# Patient Record
Sex: MALE | Race: WHITE | NOT HISPANIC OR LATINO | URBAN - METROPOLITAN AREA
[De-identification: names, ages, dates, MRNs, and addresses within clinical notes are randomized per-mention and may not be internally consistent; named-entity substitution may affect disease eponyms.]

---

## 2022-06-03 ENCOUNTER — OUTPATIENT (OUTPATIENT)
Dept: OUTPATIENT SERVICES | Facility: HOSPITAL | Age: 57
LOS: 1 days | End: 2022-06-03
Payer: COMMERCIAL

## 2022-06-03 DIAGNOSIS — I25.10 ATHEROSCLEROTIC HEART DISEASE OF NATIVE CORONARY ARTERY WITHOUT ANGINA PECTORIS: ICD-10-CM

## 2022-06-03 PROCEDURE — A9500: CPT

## 2022-06-03 PROCEDURE — 78452 HT MUSCLE IMAGE SPECT MULT: CPT

## 2022-06-03 PROCEDURE — 93016 CV STRESS TEST SUPVJ ONLY: CPT

## 2022-06-03 PROCEDURE — 93017 CV STRESS TEST TRACING ONLY: CPT

## 2022-06-03 PROCEDURE — 78452 HT MUSCLE IMAGE SPECT MULT: CPT | Mod: 26

## 2022-06-03 PROCEDURE — 93018 CV STRESS TEST I&R ONLY: CPT

## 2023-01-27 PROBLEM — Z00.00 ENCOUNTER FOR PREVENTIVE HEALTH EXAMINATION: Status: ACTIVE | Noted: 2023-01-27

## 2023-01-30 ENCOUNTER — APPOINTMENT (OUTPATIENT)
Dept: MRI IMAGING | Facility: CLINIC | Age: 58
End: 2023-01-30
Payer: COMMERCIAL

## 2023-01-30 ENCOUNTER — OUTPATIENT (OUTPATIENT)
Dept: OUTPATIENT SERVICES | Facility: HOSPITAL | Age: 58
LOS: 1 days | End: 2023-01-30

## 2023-01-30 PROCEDURE — 71555 MRI ANGIO CHEST W OR W/O DYE: CPT | Mod: 26

## 2023-01-31 PROBLEM — Z78.9 SOCIAL ALCOHOL USE: Status: ACTIVE | Noted: 2023-01-31

## 2023-01-31 PROBLEM — Z87.891 FORMER SMOKER: Status: ACTIVE | Noted: 2023-01-31

## 2023-02-01 ENCOUNTER — NON-APPOINTMENT (OUTPATIENT)
Age: 58
End: 2023-02-01

## 2023-02-01 ENCOUNTER — APPOINTMENT (OUTPATIENT)
Dept: CARDIOTHORACIC SURGERY | Facility: CLINIC | Age: 58
End: 2023-02-01
Payer: COMMERCIAL

## 2023-02-01 VITALS
OXYGEN SATURATION: 97 % | SYSTOLIC BLOOD PRESSURE: 114 MMHG | BODY MASS INDEX: 23.8 KG/M2 | HEART RATE: 50 BPM | RESPIRATION RATE: 17 BRPM | HEIGHT: 71 IN | TEMPERATURE: 97.8 F | DIASTOLIC BLOOD PRESSURE: 72 MMHG | WEIGHT: 170 LBS

## 2023-02-01 DIAGNOSIS — I10 ESSENTIAL (PRIMARY) HYPERTENSION: ICD-10-CM

## 2023-02-01 DIAGNOSIS — Z82.49 FAMILY HISTORY OF ISCHEMIC HEART DISEASE AND OTHER DISEASES OF THE CIRCULATORY SYSTEM: ICD-10-CM

## 2023-02-01 DIAGNOSIS — Z87.891 PERSONAL HISTORY OF NICOTINE DEPENDENCE: ICD-10-CM

## 2023-02-01 DIAGNOSIS — Z78.9 OTHER SPECIFIED HEALTH STATUS: ICD-10-CM

## 2023-02-01 DIAGNOSIS — K44.9 DIAPHRAGMATIC HERNIA W/OUT OBSTRUCTION OR GANGRENE: ICD-10-CM

## 2023-02-01 DIAGNOSIS — I25.10 ATHEROSCLEROTIC HEART DISEASE OF NATIVE CORONARY ARTERY W/OUT ANGINA PECTORIS: ICD-10-CM

## 2023-02-01 PROCEDURE — 99204 OFFICE O/P NEW MOD 45 MIN: CPT

## 2023-02-02 PROBLEM — Z82.49 FAMILY HISTORY OF ATRIAL FIBRILLATION: Status: ACTIVE | Noted: 2023-02-02

## 2023-02-02 PROBLEM — K44.9 HIATAL HERNIA: Status: ACTIVE | Noted: 2023-02-02

## 2023-02-02 PROBLEM — I25.10 NONOBSTRUCTIVE ATHEROSCLEROSIS OF CORONARY ARTERY: Status: ACTIVE | Noted: 2023-02-02

## 2023-02-02 PROBLEM — Z82.49 FAMILY HISTORY OF THORACIC AORTIC ANEURYSM: Status: ACTIVE | Noted: 2023-02-02

## 2023-02-02 PROBLEM — I10 HYPERTENSION, UNSPECIFIED TYPE: Status: ACTIVE | Noted: 2023-02-02

## 2023-02-06 NOTE — PROCEDURE
[FreeTextEntry1] : Patient was advised to view the educational video prior to this visit regarding aortic pathology, risk factors, surgical procedures, and lifestyle modifications. Video can be retrieved at [email or web address].\par

## 2023-02-06 NOTE — CONSULT LETTER
[Dear  ___] : Dear  [unfilled], [FreeTextEntry2] : Sanjay Joiner MD  [FreeTextEntry1] : I had the pleasure of seeing your patient, YULIA ZARAGOZA,in my office today. \par \par We take a multidisciplinary team approach to patient care and consider you, the primary physician, an extension of our team. We will maintain an open line of communication with you throughout your patient's treatment course. \par \par He  is being evaluated for thoracic aortic aneurysm. I have reviewed all of the patient's medical records and diagnostic images at the time of his  office consultation. I have enclosed a copy for your records. \par \par I have reviewed the indications for surgery,and used our webpage [email or web address] <[email or web address]> to illustrate the aorta and anatomy of the heart. The patient meets criteria for surgery. I have recommended that the patient is a candidate for a valve sparing aortic root replacement, possible aortic valve replacement.  I will update you on his perioperative status and disposition upon discharge. \par \par I appreciate the opportunity to care for your patient at the [facility] based at [facility]. If there are any questions or concerns, please call me directly at [phone]. \par \par Please see my note below. \par \par Sincerely, \par \par \par \par Yosef Cerna M.D.\par Professor of Cardiovascular and Thoracic Surgery\par Minimally Invasive Valve Surgeon\par Director of Aortic Surgery, [facility]\par Cell: [phone]\par Email: [email or web address] \par \par [facility]:\par [address], [address], [place] [address]\par Office: [phone]\par Fax: [phone]\par \par [facility]:\par Department of Cardiovascular and Thoracic Surgery\par [address] [place], [place], [address]\par Office: [phone]\par Fax: [phone]\par \par Practice Manager: Ms. Nelly Sethi\par Email: [email or web address]\par Phone: [phone]

## 2023-02-06 NOTE — PHYSICAL EXAM
[General Appearance - Alert] : alert [General Appearance - In No Acute Distress] : in no acute distress [Sclera] : the sclera and conjunctiva were normal [Outer Ear] : the ears and nose were normal in appearance [Neck Appearance] : the appearance of the neck was normal [] : no respiratory distress [Respiration, Rhythm And Depth] : normal respiratory rhythm and effort [Heart Rate And Rhythm] : heart rate was normal and rhythm regular [Heart Sounds] : normal S1 and S2 [Examination Of The Chest] : the chest was normal in appearance [2+] : left 2+ [No Abnormalities] : the abdominal aorta was not enlarged and no bruit was heard [Bowel Sounds] : normal bowel sounds [Abdomen Soft] : soft [No CVA Tenderness] : no ~M costovertebral angle tenderness [Abnormal Walk] : normal gait [Skin Color & Pigmentation] : normal skin color and pigmentation [No Focal Deficits] : no focal deficits [Oriented To Time, Place, And Person] : oriented to person, place, and time [FreeTextEntry1] : deferred

## 2023-02-06 NOTE — HISTORY OF PRESENT ILLNESS
[FreeTextEntry1] : 57 year old male with a past medical hx of nonobstructive CAD and HTN who presents for initial evaluation and management of aortic root and ascending aortic aneurysm. Family hx significant for aortic aneurysm (2 brothers under surveillance). \par Cardiologist Dr. Sanjay Joiner. \par PCP Dr. Dottie Oh. \par \par Patient has known of TAA since 4.7cm. Recent imaging has demonstrated aortic root has expanded to 5.2cm. \par \par TTE 1/25/2023: normal EF. aortic root 5.2cm. ascending aorta 4.7cm. \par trileaflet aortic valve with mild to moderate aortic regurgitation. mild aortic valve leaflet calcifications.\par \par MRA chest 1/30/23:\par 1. Thoracic aortic aneurysm measuring up to 5.1 cm in the aortic root.\par 2. Moderate-sized hiatal hernia with intrathoracic stomach.\par \par Patient is doing well and denies recent hospitalization, ER visits, or surgeries. He  denies fever, chills, fatigue, headache, blurred vision, dizziness, syncope, chest pain, palpitations, shortness of breath, orthopnea, paroxysmal nocturnal dyspnea, nausea, vomiting, abdominal pain, back pain, BRBPR or swelling to legs.\par \par Patient runs 4x/week and is physically active. \par

## 2023-02-06 NOTE — END OF VISIT
[Time Spent: ___ minutes] : I have spent [unfilled] minutes of time on the encounter. [FreeTextEntry3] : \par I, MELISSA CAMPBELLU , am scribing for and in the presence of ANGELA VEGA the following sections: History of present illness, past Medical/family/surgical/family/social history, review of systems, vital signs, physical exam and disposition.\par \par I personally performed the services described in the documentation, reviewed the documentation recorded by the scribe in my presence and it accurately and completely records my words and actions.\par

## 2023-02-06 NOTE — ASSESSMENT
[FreeTextEntry1] : 57 year old male with a past medical hx of nonobstructive CAD and HTN who presents for initial evaluation and management of aortic root and ascending aortic aneurysm. Family hx significant for TAA (2brothers). \par \par I have reviewed the patient's medical records, diagnostic images during the time of this office consultation and have made the following recommendation. I have reviewed the indications for surgery, and used our webpage [email or web address] <[email or web address]> to illustrate the aorta and anatomy of the heart. I have discussed the indications for surgery with the patient. Those indications are the following: size greater than 5.0 cm, symptomatic aneurysms, family history of aortic dissection or aneurysm death with a size greater than 4.5 cm, other necessary cardiac procedures such as coronary artery bypass grafting or valvular disorders with an aneurysm greater than 4.5 cm, or connective tissue disorders with an aneurysm size greater than 4.5 cm. The patient meets the indications.\par \par I had a lengthy discussion with the patient regarding his aneurysmal disease and progression. I have recommended that the patient is a candidate for a valve sparing aortic root replacement, possible aortic valve replacement. In the event that the aortic valve requires a replacement intraop under direct visualization, the patient was educated on various valve options - mechanical vs. bioprosthesis and has chosen a bioprosthesis. \par \par I have discussed the risks, benefits and alternatives to surgery. I have explained the risks of the surgery, including approximately 1% major mortality or morbidity including stroke, infection, bleeding, death, renal failure and heart attack. All questions were addressed and patient agrees to proceed with surgery. I have answered all questions fully.\par \par -the patient is a candidate for a valve sparing aortic root replacement, possible AVR. date of surgery pending: end of February. pt will contact me end of next week to finalize surgery date. \par -admit the day before for cardiac cath and preop testing. covid test 3 days prior to sx. will schedule on admission (carotid doppler, chest xray, labs). \par -continue current medication regimen. \par -continue cardiology care with Dr. Sanjay Joiner. \par -thoracic f/u for hiatal hernia after surgery. \par \par

## 2023-02-14 ENCOUNTER — NON-APPOINTMENT (OUTPATIENT)
Age: 58
End: 2023-02-14

## 2023-03-02 ENCOUNTER — NON-APPOINTMENT (OUTPATIENT)
Age: 58
End: 2023-03-02

## 2023-03-13 ENCOUNTER — LABORATORY RESULT (OUTPATIENT)
Age: 58
End: 2023-03-13

## 2023-03-14 PROBLEM — I10 ESSENTIAL (PRIMARY) HYPERTENSION: Chronic | Status: ACTIVE | Noted: 2023-03-09

## 2023-03-14 PROBLEM — I42.8 OTHER CARDIOMYOPATHIES: Chronic | Status: ACTIVE | Noted: 2023-03-09

## 2023-03-14 PROBLEM — Z86.79 PERSONAL HISTORY OF OTHER DISEASES OF THE CIRCULATORY SYSTEM: Chronic | Status: ACTIVE | Noted: 2023-03-09

## 2023-03-15 ENCOUNTER — TRANSCRIPTION ENCOUNTER (OUTPATIENT)
Age: 58
End: 2023-03-15

## 2023-03-15 ENCOUNTER — INPATIENT (INPATIENT)
Facility: HOSPITAL | Age: 58
LOS: 5 days | Discharge: HOME HEALTH | DRG: 216 | End: 2023-03-21
Attending: THORACIC SURGERY (CARDIOTHORACIC VASCULAR SURGERY) | Admitting: THORACIC SURGERY (CARDIOTHORACIC VASCULAR SURGERY)
Payer: COMMERCIAL

## 2023-03-15 VITALS
OXYGEN SATURATION: 100 % | RESPIRATION RATE: 18 BRPM | HEART RATE: 52 BPM | DIASTOLIC BLOOD PRESSURE: 58 MMHG | SYSTOLIC BLOOD PRESSURE: 131 MMHG | HEIGHT: 71 IN | TEMPERATURE: 98 F | WEIGHT: 169.98 LBS

## 2023-03-15 DIAGNOSIS — Z98.890 OTHER SPECIFIED POSTPROCEDURAL STATES: Chronic | ICD-10-CM

## 2023-03-15 LAB
A1C WITH ESTIMATED AVERAGE GLUCOSE RESULT: 5.4 % — SIGNIFICANT CHANGE UP (ref 4–5.6)
ALBUMIN SERPL ELPH-MCNC: 4.2 G/DL — SIGNIFICANT CHANGE UP (ref 3.3–5)
ALP SERPL-CCNC: 63 U/L — SIGNIFICANT CHANGE UP (ref 40–120)
ALT FLD-CCNC: 24 U/L — SIGNIFICANT CHANGE UP (ref 10–45)
ANION GAP SERPL CALC-SCNC: 12 MMOL/L — SIGNIFICANT CHANGE UP (ref 5–17)
APPEARANCE UR: CLEAR — SIGNIFICANT CHANGE UP
APTT BLD: 26.3 SEC — LOW (ref 27.5–35.5)
AST SERPL-CCNC: 21 U/L — SIGNIFICANT CHANGE UP (ref 10–40)
BASE EXCESS BLDA CALC-SCNC: -0.2 MMOL/L — SIGNIFICANT CHANGE UP (ref -2–3)
BASOPHILS # BLD AUTO: 0.03 K/UL — SIGNIFICANT CHANGE UP (ref 0–0.2)
BASOPHILS NFR BLD AUTO: 0.5 % — SIGNIFICANT CHANGE UP (ref 0–2)
BILIRUB SERPL-MCNC: 0.7 MG/DL — SIGNIFICANT CHANGE UP (ref 0.2–1.2)
BILIRUB UR-MCNC: NEGATIVE — SIGNIFICANT CHANGE UP
BLD GP AB SCN SERPL QL: NEGATIVE — SIGNIFICANT CHANGE UP
BLD GP AB SCN SERPL QL: NEGATIVE — SIGNIFICANT CHANGE UP
BUN SERPL-MCNC: 28 MG/DL — HIGH (ref 7–23)
CA-I BLDA-SCNC: 1.19 MMOL/L — SIGNIFICANT CHANGE UP (ref 1.15–1.33)
CALCIUM SERPL-MCNC: 8.8 MG/DL — SIGNIFICANT CHANGE UP (ref 8.4–10.5)
CHLORIDE SERPL-SCNC: 109 MMOL/L — HIGH (ref 96–108)
CHOLEST SERPL-MCNC: 136 MG/DL — SIGNIFICANT CHANGE UP
CK MB CFR SERPL CALC: 2.9 NG/ML — SIGNIFICANT CHANGE UP (ref 0–6.7)
CK SERPL-CCNC: 138 U/L — SIGNIFICANT CHANGE UP (ref 30–200)
CO2 BLDA-SCNC: 25 MMOL/L — HIGH (ref 19–24)
CO2 SERPL-SCNC: 21 MMOL/L — LOW (ref 22–31)
COHGB MFR BLDA: 1.2 % — SIGNIFICANT CHANGE UP
COLOR SPEC: YELLOW — SIGNIFICANT CHANGE UP
CREAT SERPL-MCNC: 0.97 MG/DL — SIGNIFICANT CHANGE UP (ref 0.5–1.3)
DIFF PNL FLD: NEGATIVE — SIGNIFICANT CHANGE UP
EGFR: 92 ML/MIN/1.73M2 — SIGNIFICANT CHANGE UP
EOSINOPHIL # BLD AUTO: 0.1 K/UL — SIGNIFICANT CHANGE UP (ref 0–0.5)
EOSINOPHIL NFR BLD AUTO: 1.8 % — SIGNIFICANT CHANGE UP (ref 0–6)
ESTIMATED AVERAGE GLUCOSE: 108 MG/DL — SIGNIFICANT CHANGE UP (ref 68–114)
GLUCOSE BLDA-MCNC: 95 MG/DL — SIGNIFICANT CHANGE UP (ref 70–99)
GLUCOSE SERPL-MCNC: 92 MG/DL — SIGNIFICANT CHANGE UP (ref 70–99)
GLUCOSE UR QL: NEGATIVE — SIGNIFICANT CHANGE UP
HCO3 BLDA-SCNC: 24 MMOL/L — SIGNIFICANT CHANGE UP (ref 21–28)
HCT VFR BLD CALC: 38.7 % — LOW (ref 39–50)
HDLC SERPL-MCNC: 48 MG/DL — SIGNIFICANT CHANGE UP
HGB BLD-MCNC: 13 G/DL — SIGNIFICANT CHANGE UP (ref 13–17)
HGB BLDA-MCNC: 12.5 G/DL — LOW (ref 12.6–17.4)
IMM GRANULOCYTES NFR BLD AUTO: 0.2 % — SIGNIFICANT CHANGE UP (ref 0–0.9)
INR BLD: 1.02 — SIGNIFICANT CHANGE UP (ref 0.88–1.16)
KETONES UR-MCNC: NEGATIVE — SIGNIFICANT CHANGE UP
LEUKOCYTE ESTERASE UR-ACNC: NEGATIVE — SIGNIFICANT CHANGE UP
LIPID PNL WITH DIRECT LDL SERPL: 74 MG/DL — SIGNIFICANT CHANGE UP
LYMPHOCYTES # BLD AUTO: 1.33 K/UL — SIGNIFICANT CHANGE UP (ref 1–3.3)
LYMPHOCYTES # BLD AUTO: 24.1 % — SIGNIFICANT CHANGE UP (ref 13–44)
MCHC RBC-ENTMCNC: 31.1 PG — SIGNIFICANT CHANGE UP (ref 27–34)
MCHC RBC-ENTMCNC: 33.6 GM/DL — SIGNIFICANT CHANGE UP (ref 32–36)
MCV RBC AUTO: 92.6 FL — SIGNIFICANT CHANGE UP (ref 80–100)
METHGB MFR BLDA: 0.7 % — SIGNIFICANT CHANGE UP
MONOCYTES # BLD AUTO: 0.58 K/UL — SIGNIFICANT CHANGE UP (ref 0–0.9)
MONOCYTES NFR BLD AUTO: 10.5 % — SIGNIFICANT CHANGE UP (ref 2–14)
NEUTROPHILS # BLD AUTO: 3.46 K/UL — SIGNIFICANT CHANGE UP (ref 1.8–7.4)
NEUTROPHILS NFR BLD AUTO: 62.9 % — SIGNIFICANT CHANGE UP (ref 43–77)
NITRITE UR-MCNC: NEGATIVE — SIGNIFICANT CHANGE UP
NON HDL CHOLESTEROL: 88 MG/DL — SIGNIFICANT CHANGE UP
NRBC # BLD: 0 /100 WBCS — SIGNIFICANT CHANGE UP (ref 0–0)
OXYHGB MFR BLDA: 97 % — HIGH (ref 90–95)
PCO2 BLDA: 37 MMHG — SIGNIFICANT CHANGE UP (ref 35–48)
PH BLDA: 7.42 — SIGNIFICANT CHANGE UP (ref 7.35–7.45)
PH UR: 6 — SIGNIFICANT CHANGE UP (ref 5–8)
PLATELET # BLD AUTO: 187 K/UL — SIGNIFICANT CHANGE UP (ref 150–400)
PO2 BLDA: 129 MMHG — HIGH (ref 83–108)
POCT ISTAT CREATININE: 0.9 MG/DL — SIGNIFICANT CHANGE UP (ref 0.5–1.3)
POCT ISTAT CREATININE: 1 MG/DL — SIGNIFICANT CHANGE UP (ref 0.5–1.3)
POTASSIUM BLDA-SCNC: 4 MMOL/L — SIGNIFICANT CHANGE UP (ref 3.5–5.1)
POTASSIUM SERPL-MCNC: 3.9 MMOL/L — SIGNIFICANT CHANGE UP (ref 3.5–5.3)
POTASSIUM SERPL-SCNC: 3.9 MMOL/L — SIGNIFICANT CHANGE UP (ref 3.5–5.3)
PROT SERPL-MCNC: 6.4 G/DL — SIGNIFICANT CHANGE UP (ref 6–8.3)
PROT UR-MCNC: NEGATIVE MG/DL — SIGNIFICANT CHANGE UP
PROTHROM AB SERPL-ACNC: 12.2 SEC — SIGNIFICANT CHANGE UP (ref 10.5–13.4)
RBC # BLD: 4.18 M/UL — LOW (ref 4.2–5.8)
RBC # FLD: 13.2 % — SIGNIFICANT CHANGE UP (ref 10.3–14.5)
RH IG SCN BLD-IMP: POSITIVE — SIGNIFICANT CHANGE UP
RH IG SCN BLD-IMP: POSITIVE — SIGNIFICANT CHANGE UP
SAO2 % BLDA: 98.9 % — HIGH (ref 94–98)
SODIUM BLDA-SCNC: 140 MMOL/L — SIGNIFICANT CHANGE UP (ref 136–145)
SODIUM SERPL-SCNC: 142 MMOL/L — SIGNIFICANT CHANGE UP (ref 135–145)
SP GR SPEC: 1.02 — SIGNIFICANT CHANGE UP (ref 1–1.03)
TRIGL SERPL-MCNC: 71 MG/DL — SIGNIFICANT CHANGE UP
TROPONIN T SERPL-MCNC: 0.01 NG/ML — SIGNIFICANT CHANGE UP (ref 0–0.01)
TSH SERPL-MCNC: 1.4 UIU/ML — SIGNIFICANT CHANGE UP (ref 0.27–4.2)
UROBILINOGEN FLD QL: 0.2 E.U./DL — SIGNIFICANT CHANGE UP
WBC # BLD: 5.51 K/UL — SIGNIFICANT CHANGE UP (ref 3.8–10.5)
WBC # FLD AUTO: 5.51 K/UL — SIGNIFICANT CHANGE UP (ref 3.8–10.5)

## 2023-03-15 PROCEDURE — 94010 BREATHING CAPACITY TEST: CPT | Mod: 26

## 2023-03-15 PROCEDURE — 93880 EXTRACRANIAL BILAT STUDY: CPT | Mod: 26

## 2023-03-15 PROCEDURE — 93010 ELECTROCARDIOGRAM REPORT: CPT

## 2023-03-15 PROCEDURE — 99152 MOD SED SAME PHYS/QHP 5/>YRS: CPT

## 2023-03-15 PROCEDURE — 71046 X-RAY EXAM CHEST 2 VIEWS: CPT | Mod: 26

## 2023-03-15 PROCEDURE — 93454 CORONARY ARTERY ANGIO S&I: CPT | Mod: 26

## 2023-03-15 RX ORDER — SODIUM CHLORIDE 9 MG/ML
3 INJECTION INTRAMUSCULAR; INTRAVENOUS; SUBCUTANEOUS ONCE
Refills: 0 | Status: DISCONTINUED | OUTPATIENT
Start: 2023-03-15 | End: 2023-03-16

## 2023-03-15 RX ORDER — HEPARIN SODIUM 5000 [USP'U]/ML
5000 INJECTION INTRAVENOUS; SUBCUTANEOUS ONCE
Refills: 0 | Status: DISCONTINUED | OUTPATIENT
Start: 2023-03-15 | End: 2023-03-16

## 2023-03-15 RX ORDER — CHLORHEXIDINE GLUCONATE 213 G/1000ML
1 SOLUTION TOPICAL ONCE
Refills: 0 | Status: DISCONTINUED | OUTPATIENT
Start: 2023-03-15 | End: 2023-03-16

## 2023-03-15 RX ORDER — ASPIRIN/CALCIUM CARB/MAGNESIUM 324 MG
81 TABLET ORAL DAILY
Refills: 0 | Status: DISCONTINUED | OUTPATIENT
Start: 2023-03-15 | End: 2023-03-16

## 2023-03-15 RX ORDER — CHLORHEXIDINE GLUCONATE 213 G/1000ML
15 SOLUTION TOPICAL ONCE
Refills: 0 | Status: COMPLETED | OUTPATIENT
Start: 2023-03-15 | End: 2023-03-16

## 2023-03-15 RX ORDER — CHLORHEXIDINE GLUCONATE 213 G/1000ML
1 SOLUTION TOPICAL ONCE
Refills: 0 | Status: COMPLETED | OUTPATIENT
Start: 2023-03-15 | End: 2023-03-15

## 2023-03-15 RX ORDER — CHLORHEXIDINE GLUCONATE 213 G/1000ML
1 SOLUTION TOPICAL ONCE
Refills: 0 | Status: COMPLETED | OUTPATIENT
Start: 2023-03-16 | End: 2023-03-16

## 2023-03-15 RX ORDER — ATORVASTATIN CALCIUM 80 MG/1
80 TABLET, FILM COATED ORAL AT BEDTIME
Refills: 0 | Status: DISCONTINUED | OUTPATIENT
Start: 2023-03-15 | End: 2023-03-16

## 2023-03-15 RX ORDER — POTASSIUM CHLORIDE 20 MEQ
20 PACKET (EA) ORAL ONCE
Refills: 0 | Status: COMPLETED | OUTPATIENT
Start: 2023-03-15 | End: 2023-03-15

## 2023-03-15 RX ORDER — SODIUM CHLORIDE 9 MG/ML
250 INJECTION INTRAMUSCULAR; INTRAVENOUS; SUBCUTANEOUS ONCE
Refills: 0 | Status: DISCONTINUED | OUTPATIENT
Start: 2023-03-15 | End: 2023-03-15

## 2023-03-15 RX ORDER — PANTOPRAZOLE SODIUM 20 MG/1
40 TABLET, DELAYED RELEASE ORAL
Refills: 0 | Status: DISCONTINUED | OUTPATIENT
Start: 2023-03-15 | End: 2023-03-16

## 2023-03-15 RX ADMIN — Medication 20 MILLIEQUIVALENT(S): at 13:28

## 2023-03-15 RX ADMIN — ATORVASTATIN CALCIUM 80 MILLIGRAM(S): 80 TABLET, FILM COATED ORAL at 23:21

## 2023-03-15 RX ADMIN — CHLORHEXIDINE GLUCONATE 1 APPLICATION(S): 213 SOLUTION TOPICAL at 23:30

## 2023-03-15 NOTE — H&P ADULT - HISTORY OF PRESENT ILLNESS
Cardiologist: Dr. Sanjay Joiner  Pharmacy:   Escort:  COVID: 03/13/2023 (-) in HIE     58yo M former smoker with +FHx aortic aneurysm (2 brothers) PMHx nonobstructive CAD, HTN, hiatal hernia presented to Dr. Cerna for evaluation of aortic root and ascending aortic aneurysm. Known TAA since 4.7cm that has recently expanded to 5.2cm. Denies ____ chest pain, SOB, LUO, palpitations, dizziness, LOC, N/V/D, fever/chills/sick contact, diaphoresis, orthopnea/PND, and leg swelling. TTE 1/25/23: normal EF, aortic root 5.2cm, ascending aorta 4.7cm, trileaflet aortic valve with mild-mod AR. MRA 1/30/23: thoracic aortic aneurysm measuring up to 5.1cm in aortic root, mod sized hiatal hernia with intrathoracic stomach. Renal US 1/25/23: mild diffuse plaque in abdominal aorta, no renal artery stenosis. Patient presents for pre-op [facility] prior to valve sparing aortic root replacement with possible AV replacement. Patient to be admitted to [facility] post procedure for procedure with Dr. Cerna.       [facility] - pre-op testing ordered (carotid doppler, CXR)**   Cardiologist: Dr. Sanjay Joiner  Pharmacy: [facility]   Escort: To Be Admitted to [facility] post procedure   COVID: 03/13/2023 (-) in HIE     58yo M,  with +FHx aortic aneurysm (2 brothers) PMHx nonobstructive CAD, HTN, hiatal hernia presented to Dr. Cerna for evaluation of aortic root and ascending aortic aneurysm. Known TAA since 4.7cm that has recently expanded to 5.2cm. Denies chest pain, SOB, LUO, palpitations, dizziness, LOC, N/V/D, fever/chills/sick contact, diaphoresis, orthopnea/PND, and leg swelling. TTE 1/25/23: normal EF, aortic root 5.2cm, ascending aorta 4.7cm, trileaflet aortic valve with mild-mod AR. MRA 1/30/23: thoracic aortic aneurysm measuring up to 5.1cm in aortic root, mod sized hiatal hernia with intrathoracic stomach. Renal US 1/25/23: mild diffuse plaque in abdominal aorta, no renal artery stenosis. Patient presents for pre-op [facility] prior to valve sparing aortic root replacement with possible AV replacement. Patient to be admitted to [facility] post procedure for procedure with Dr. Cerna.       **Admit to [facility] after cardiac cath - pre-op testing ordered (carotid doppler, CXR)**

## 2023-03-15 NOTE — H&P ADULT - NSICDXFAMILYHX_GEN_ALL_CORE_FT
FAMILY HISTORY:  Sibling  Still living? Unknown  FHx: thoracic aortic aneurysm, Age at diagnosis: Age Unknown

## 2023-03-15 NOTE — PRE-ANESTHESIA EVALUATION ADULT - NSANTHPMHFT_GEN_ALL_CORE
58yo M,  with +FHx aortic aneurysm (2 brothers) PMHx nonobstructive CAD, HTN, hiatal hernia presented to Dr. Cerna for evaluation of aortic root and ascending aortic aneurysm. Known TAA since 4.7cm that has recently expanded to 5.2cm. Denies chest pain, SOB, LUO, palpitations, dizziness, LOC, N/V/D, fever/chills/sick contact, diaphoresis, orthopnea/PND, and leg swelling. TTE 1/25/23: normal EF, aortic root 5.2cm, ascending aorta 4.7cm, trileaflet aortic valve with mild-mod AR. MRA 1/30/23: thoracic aortic aneurysm measuring up to 5.1cm in aortic root, mod sized hiatal hernia with intrathoracic stomach. Renal US 1/25/23: mild diffuse plaque in abdominal aorta, no renal artery stenosis. Patient presents for pre-op C prior to valve sparing aortic root replacement with possible AV replacement. Patient to be admitted to [facility] post procedure for procedure with Dr. Cerna.       	No Known Allergies:     Home Medications:   •?	omeprazole 20 mg oral delayed release tablet: Last Dose Taken: 15-Mar-2023 AM, 1 tab(s) orally once a day  •?	Crestor 20 mg oral tablet: Last Dose Taken: 14-Mar-2023 PM, 1 tab(s) orally once a day  •?	losartan 25 mg oral tablet: Last Dose Taken: 15-Mar-2023 AM, 1 tab(s) orally once a day    PAST MEDICAL HISTORY:  H/O aortic aneurysm   HTN (hypertension)   Nonobstructive cardiomyopathy.     PAST SURGICAL HISTORY:  H/O knee surgery   History of hip surgery.

## 2023-03-15 NOTE — PROGRESS NOTE ADULT - SUBJECTIVE AND OBJECTIVE BOX
Planned Date of Surgery:       3/16/23                                                                                                           Surgeon: Nehemiah    Procedure: Valve sparing aortic root placement, EF NL    HPI:  58yo M,  with +FHx aortic aneurysm (2 brothers) PMHx nonobstructive CAD, HTN, hiatal hernia presented to Dr. Cerna for evaluation of aortic root and ascending aortic aneurysm. Known TAA since 4.7cm that has recently expanded to 5.2cm. Denies chest pain, SOB, LUO, palpitations, dizziness, LOC, N/V/D, fever/chills/sick contact, diaphoresis, orthopnea/PND, and leg swelling. TTE 1/25/23: normal EF, aortic root 5.2cm, ascending aorta 4.7cm, trileaflet aortic valve with mild-mod AR. MRA 1/30/23: thoracic aortic aneurysm measuring up to 5.1cm in aortic root, mod sized hiatal hernia with intrathoracic stomach. Renal US 1/25/23: mild diffuse plaque in abdominal aorta, no renal artery stenosis. Patient presents for pre-op [facility] prior to valve sparing aortic root replacement with possible AV replacement. Patient to be admitted to [facility] post procedure for procedure with Dr. Cerna. LHC showed non obstructive CAD.    PAST MEDICAL & SURGICAL HISTORY:  H/O aortic aneurysm      HTN (hypertension)      Nonobstructive cardiomyopathy      History of bilateral hip replacement      H/O knee surgery          No Known Allergies      Physical Exam  T(C): 36.7 (03-15-23 @ 11:42), Max: 36.7 (03-15-23 @ 11:42)  HR: 52 (03-15-23 @ 11:42) (52 - 52)  BP: 119/58 (03-15-23 @ 11:42) (119/58 - 119/58)  RR: 18 (03-15-23 @ 11:42) (18 - 18)  SpO2: 100% (03-15-23 @ 11:42) (100% - 100%)  GEN: NAD, looks comfortable  Psych: Mood appropriate  Neuro: A&Ox3.  No focal deficits.  Moving all extremities.   HEENT: No obvious abnormalities  CV: S1S2, regular, no murmurs appreciated.  No carotid bruits.  No JVD  Lungs: Clear B/L.  No wheezing, rales or rhonchi  ABD: Soft, non-tender, non-distended.  +Bowel sounds  EXT: Warm and well perfused.  No peripheral edema noted  Musculoskeletal: Moving all extremities with normal ROM, no joint swelling  PV: Pedal pulses palpable      MEDICATIONS  (STANDING):  aspirin  chewable 81 milliGRAM(s) Oral daily  atorvastatin 80 milliGRAM(s) Oral at bedtime  chlorhexidine 0.12% Liquid 15 milliLiter(s) Swish and Spit once  chlorhexidine 4% Liquid 1 Application(s) Topical once  chlorhexidine 4% Liquid 1 Application(s) Topical once  chlorhexidine 4% Liquid 1 Application(s) Topical Once  heparin   Injectable 5000 Unit(s) SubCutaneous Once  pantoprazole    Tablet 40 milliGRAM(s) Oral before breakfast  sodium chloride 0.9% lock flush 3 milliLiter(s) IV Push Once    MEDICATIONS  (PRN):      On Beta Blocker? CONTRAINDICATED Reason Bradycardia    Labs:                        13.0   5.51  )-----------( 187      ( 15 Mar 2023 11:55 )             38.7     03-15    142  |  109<H>  |  28<H>  ----------------------------<  92  3.9   |  21<L>  |  0.97    Ca    8.8      15 Mar 2023 11:42    TPro  6.4  /  Alb  4.2  /  TBili  0.7  /  DBili  x   /  AST  21  /  ALT  24  /  AlkPhos  63  03-15    PT/INR - ( 15 Mar 2023 11:55 )   PT: 12.2 sec;   INR: 1.02          PTT - ( 15 Mar 2023 11:55 )  PTT:26.3 sec      ABG - ( 15 Mar 2023 12:47 )  pH, Arterial: 7.42  pH, Blood: x     /  pCO2: 37    /  pO2: 129   / HCO3: 24    / Base Excess: -0.2  /  SaO2: 98.9              CARDIAC MARKERS ( 15 Mar 2023 11:42 )  x     / x     / 138 U/L / x     / 2.9 ng/mL          Hgb A1C: 5.4    EKG: Sinus bradycardia    CXR: Clear lungs    CT Scans: < from: MR Angio Chest w/wo IV Cont (Not Myocard) (01.30.23 @ 13:25) >  1. Thoracic aortic aneurysm measuring up to 5.1 cm in the aortic root.    2. Moderate-sized hiatal hernia with intrathoracic stomach.    < end of copied text >      Cath Report: Nonobstructive CAd    Echo: 1/25/23 Normal EF, aortic root 5.2cm, ascending aortic 4.7cm, trileaflet aortic valve with mild to moderate aortic regurgitation, mild aortic valve leaflet calcifications.    PFT's: Pending    Carotid Duplex: Pending    Consult in Chart?  YES  Consent in Chart? YES   Pre-op Orders Placed? YES  Blood Products Ordered? YES   NPO ordered? YES

## 2023-03-15 NOTE — PRE-ANESTHESIA EVALUATION ADULT - NSRADCARDRESULTSFT_GEN_ALL_CORE
Cardiac Cath:  R. dominant system, minor irregularities with no obstructive lesions   Coronary angiography demonstrates minor luminal irregularities.      Coronary Angiography     The coronary circulation is right dominant.    LM   Left main artery: The segment is visually normal in size and    structure.  LAD   Left anterior descending artery: Angiography shows minor    irregularities. mLAD myocardial bridge. First diagonal: Angiography    shows minor irregularities.    CX   Circumflex: Angiography shows minor irregularities. First obtuse    marginal: Angiography shows minor irregularities.  RCA Right coronary artery: Angiography shows minor irregularities. Right    posterior descending artery: Angiography shows minor    irregularities.    Left Heart Cath     Ejection fraction was not calculated. LV to AO pullback was not    performed.

## 2023-03-15 NOTE — H&P ADULT - NSICDXPASTMEDICALHX_GEN_ALL_CORE_FT
PAST MEDICAL HISTORY:  H/O aortic aneurysm     HTN (hypertension)     Nonobstructive cardiomyopathy

## 2023-03-15 NOTE — H&P ADULT - ASSESSMENT
58yo M,  with +FHx aortic aneurysm (2 brothers) PMHx nonobstructive CAD, HTN, hiatal hernia who presents for [facility] for pre-operative clearance prior to valve sparing aortic root replacement with possible AV replacement. Patient to be admitted to [facility] post procedure for procedure with Dr. Cerna.       - EKG:  Sinus bradycardia at 5   - ASA:   III        Mallampati: II  - H/H stable:         Platelets/Coags stable. Cr:   - Patient denies hematochieza, hematuria, easy bruising, or signs of bleeding.   - No load as patient is pre-operative for CTS   - Patient given NS 0.9% 250 cc bolus x 1 as per Dr. Prince Palacios   - Patient is a suitable candidate for moderate sedation.     Risks & benefits of procedure and alternative therapy have been explained to the patient including but not limited to: allergic reaction, bleeding w/possible need for blood transfusion, infection, renal and vascular compromise, limb damage, arrhythmia, stroke, vessel dissection/perforation, Myocardial infarction, emergent CABG. Informed consent obtained and in chart.      56yo M,  with +FHx aortic aneurysm (2 brothers) PMHx nonobstructive CAD, HTN, hiatal hernia who presents for [facility] for pre-operative clearance prior to valve sparing aortic root replacement with possible AV replacement. Patient to be admitted to [facility] post procedure for procedure with Dr. Cerna.       - EKG:  Sinus bradycardia at 5   - ASA:   III        Mallampati: II  - H/H stable:  13.0/38.7       Platelets/Coags stable. Cr: 0.9  - Patient's K: 3.7, given KCL 20 mEq PO x 1  - Patient denies hematochieza, hematuria, easy bruising, or signs of bleeding.   - No load as patient is pre-operative for CTS   - Patient given NS 0.9% 250 cc bolus x 1 as per Dr. Prince Palacios   - Patient is a suitable candidate for moderate sedation.     Risks & benefits of procedure and alternative therapy have been explained to the patient including but not limited to: allergic reaction, bleeding w/possible need for blood transfusion, infection, renal and vascular compromise, limb damage, arrhythmia, stroke, vessel dissection/perforation, Myocardial infarction, emergent CABG. Informed consent obtained and in chart.      56yo M,  with +FHx aortic aneurysm (2 brothers) PMHx nonobstructive CAD, HTN, hiatal hernia who presents for [facility] for pre-operative clearance prior to valve sparing aortic root replacement with possible AV replacement. Patient to be admitted to [facility] post procedure for procedure with Dr. Cerna.       - EKG:  Sinus bradycardia at 58 bpm, no acute ST-T wave changes   - ASA:   III        Mallampati: II  - H/H stable:  13.0/38.7       Platelets/Coags stable. Cr: 0.9  - Patient's K: 3.7, given KCL 20 mEq PO x 1  - Patient denies hematochieza, hematuria, easy bruising, or signs of bleeding.   - No load as patient is pre-operative for CTS   - Patient given NS 0.9% 250 cc bolus x 1 as per Dr. Prince Palacios   - Patient is a suitable candidate for moderate sedation.     Risks & benefits of procedure and alternative therapy have been explained to the patient including but not limited to: allergic reaction, bleeding w/possible need for blood transfusion, infection, renal and vascular compromise, limb damage, arrhythmia, stroke, vessel dissection/perforation, Myocardial infarction, emergent CABG. Informed consent obtained and in chart.

## 2023-03-15 NOTE — PROGRESS NOTE ADULT - ASSESSMENT
58yo M,  with +FHx aortic aneurysm (2 brothers) PMHx nonobstructive CAD, HTN, hiatal hernia presented to Dr. Cerna for evaluation of aortic root and ascending aortic aneurysm. Known TAA since 4.7cm that has recently expanded to 5.2cm. Denies chest pain, SOB, LUO, palpitations, dizziness, LOC, N/V/D, fever/chills/sick contact, diaphoresis, orthopnea/PND, and leg swelling. TTE 1/25/23: normal EF, aortic root 5.2cm, ascending aorta 4.7cm, trileaflet aortic valve with mild-mod AR. MRA 1/30/23: thoracic aortic aneurysm measuring up to 5.1cm in aortic root, mod sized hiatal hernia with intrathoracic stomach. Renal US 1/25/23: mild diffuse plaque in abdominal aorta, no renal artery stenosis. Patient presents for pre-op [facility] prior to valve sparing aortic root replacement with possible AV replacement. Patient to be admitted to [facility] post procedure for procedure with Dr. Cerna. LHC showed non obstructive CAD.    ==== Neurovascular ====  -No delirium, pain well managed on current regimen  -C/w PRNs for Pain control  -Monitor neuro status    ==== Respiratory ====  -Saturates well on RA     -AM CXR stable, repeat in AM  -Encourage IS 10x/hour while awake, Cough and deep breathing exercises  -Monitor respiratory status via SpO2    ==== Cardiovascular ====  Monitor on Tele  Continue aspirin 81mg QD  Continue Statin  Beta blocker held due to bradycardia  OR tomorrow for valve sparing aortic root    ==== GI ====   -Tolerating PO  -Prophylaxis: Protonix  -C/w bowel regimen    ==== /Renal ====  -BUN/Cr: 28/0.97  -Trend Cr on AM labs  -Replete electrolytes as needed    ==== ID ====   Afebrile, asymptomatic  -WBC: 5.51  -Continue to monitor for SIRS/Sepsis syndrome while inpatient    ==== Endocrine ====   -A1C: 5.4 , no h/o DM  -TSH: pending, no h/o thyroid disease     ==== Hematologic ====   -H/H: 13/38  -CBC, chem in AM  -DVT ppx: HSQ 5000u q8h and SCDs    ==== Disposition Planning ====  Telemetry

## 2023-03-16 ENCOUNTER — RESULT REVIEW (OUTPATIENT)
Age: 58
End: 2023-03-16

## 2023-03-16 ENCOUNTER — TRANSCRIPTION ENCOUNTER (OUTPATIENT)
Age: 58
End: 2023-03-16

## 2023-03-16 ENCOUNTER — APPOINTMENT (OUTPATIENT)
Dept: CARDIOTHORACIC SURGERY | Facility: HOSPITAL | Age: 58
End: 2023-03-16

## 2023-03-16 LAB
ALBUMIN SERPL ELPH-MCNC: 3 G/DL — LOW (ref 3.3–5)
ALBUMIN SERPL ELPH-MCNC: 3 G/DL — LOW (ref 3.3–5)
ALBUMIN SERPL ELPH-MCNC: 3.2 G/DL — LOW (ref 3.3–5)
ALBUMIN SERPL ELPH-MCNC: 3.7 G/DL — SIGNIFICANT CHANGE UP (ref 3.3–5)
ALP SERPL-CCNC: 48 U/L — SIGNIFICANT CHANGE UP (ref 40–120)
ALP SERPL-CCNC: 51 U/L — SIGNIFICANT CHANGE UP (ref 40–120)
ALP SERPL-CCNC: 52 U/L — SIGNIFICANT CHANGE UP (ref 40–120)
ALP SERPL-CCNC: 58 U/L — SIGNIFICANT CHANGE UP (ref 40–120)
ALT FLD-CCNC: 19 U/L — SIGNIFICANT CHANGE UP (ref 10–45)
ALT FLD-CCNC: 20 U/L — SIGNIFICANT CHANGE UP (ref 10–45)
ALT FLD-CCNC: 20 U/L — SIGNIFICANT CHANGE UP (ref 10–45)
ALT FLD-CCNC: 21 U/L — SIGNIFICANT CHANGE UP (ref 10–45)
ANION GAP SERPL CALC-SCNC: 10 MMOL/L — SIGNIFICANT CHANGE UP (ref 5–17)
ANION GAP SERPL CALC-SCNC: 7 MMOL/L — SIGNIFICANT CHANGE UP (ref 5–17)
ANION GAP SERPL CALC-SCNC: 8 MMOL/L — SIGNIFICANT CHANGE UP (ref 5–17)
ANION GAP SERPL CALC-SCNC: 9 MMOL/L — SIGNIFICANT CHANGE UP (ref 5–17)
APTT BLD: 24.5 SEC — LOW (ref 27.5–35.5)
APTT BLD: 27.7 SEC — SIGNIFICANT CHANGE UP (ref 27.5–35.5)
APTT BLD: 29 SEC — SIGNIFICANT CHANGE UP (ref 27.5–35.5)
APTT BLD: 29.1 SEC — SIGNIFICANT CHANGE UP (ref 27.5–35.5)
AST SERPL-CCNC: 17 U/L — SIGNIFICANT CHANGE UP (ref 10–40)
AST SERPL-CCNC: 44 U/L — HIGH (ref 10–40)
AST SERPL-CCNC: 51 U/L — HIGH (ref 10–40)
AST SERPL-CCNC: 52 U/L — HIGH (ref 10–40)
BASE EXCESS BLDA CALC-SCNC: -0.4 MMOL/L — SIGNIFICANT CHANGE UP (ref -2–3)
BASE EXCESS BLDA CALC-SCNC: -0.4 MMOL/L — SIGNIFICANT CHANGE UP (ref -2–3)
BASE EXCESS BLDA CALC-SCNC: -1.1 MMOL/L — SIGNIFICANT CHANGE UP (ref -2–3)
BASE EXCESS BLDA CALC-SCNC: -1.2 MMOL/L — SIGNIFICANT CHANGE UP (ref -2–3)
BASE EXCESS BLDA CALC-SCNC: -1.5 MMOL/L — SIGNIFICANT CHANGE UP (ref -2–3)
BASE EXCESS BLDA CALC-SCNC: -1.8 MMOL/L — SIGNIFICANT CHANGE UP (ref -2–3)
BASE EXCESS BLDA CALC-SCNC: -2.9 MMOL/L — LOW (ref -2–3)
BASE EXCESS BLDA CALC-SCNC: -3.3 MMOL/L — LOW (ref -2–3)
BASE EXCESS BLDA CALC-SCNC: -3.8 MMOL/L — LOW (ref -2–3)
BASE EXCESS BLDA CALC-SCNC: -4.1 MMOL/L — LOW (ref -2–3)
BASE EXCESS BLDV CALC-SCNC: -0.2 MMOL/L — SIGNIFICANT CHANGE UP (ref -2–3)
BASE EXCESS BLDV CALC-SCNC: -0.4 MMOL/L — SIGNIFICANT CHANGE UP (ref -2–3)
BASE EXCESS BLDV CALC-SCNC: -0.5 MMOL/L — SIGNIFICANT CHANGE UP (ref -2–3)
BASE EXCESS BLDV CALC-SCNC: -0.7 MMOL/L — SIGNIFICANT CHANGE UP (ref -2–3)
BASOPHILS # BLD AUTO: 0 K/UL — SIGNIFICANT CHANGE UP (ref 0–0.2)
BASOPHILS # BLD AUTO: 0.03 K/UL — SIGNIFICANT CHANGE UP (ref 0–0.2)
BASOPHILS NFR BLD AUTO: 0 % — SIGNIFICANT CHANGE UP (ref 0–2)
BASOPHILS NFR BLD AUTO: 0.5 % — SIGNIFICANT CHANGE UP (ref 0–2)
BILIRUB SERPL-MCNC: 0.6 MG/DL — SIGNIFICANT CHANGE UP (ref 0.2–1.2)
BILIRUB SERPL-MCNC: 1.1 MG/DL — SIGNIFICANT CHANGE UP (ref 0.2–1.2)
BILIRUB SERPL-MCNC: 1.4 MG/DL — HIGH (ref 0.2–1.2)
BILIRUB SERPL-MCNC: 1.6 MG/DL — HIGH (ref 0.2–1.2)
BUN SERPL-MCNC: 16 MG/DL — SIGNIFICANT CHANGE UP (ref 7–23)
BUN SERPL-MCNC: 16 MG/DL — SIGNIFICANT CHANGE UP (ref 7–23)
BUN SERPL-MCNC: 19 MG/DL — SIGNIFICANT CHANGE UP (ref 7–23)
BUN SERPL-MCNC: 23 MG/DL — SIGNIFICANT CHANGE UP (ref 7–23)
CA-I BLDA-SCNC: 0.9 MMOL/L — LOW (ref 1.15–1.33)
CA-I BLDA-SCNC: 0.91 MMOL/L — LOW (ref 1.15–1.33)
CA-I BLDA-SCNC: 0.92 MMOL/L — LOW (ref 1.15–1.33)
CA-I BLDA-SCNC: 0.92 MMOL/L — LOW (ref 1.15–1.33)
CA-I BLDA-SCNC: 0.94 MMOL/L — LOW (ref 1.15–1.33)
CA-I BLDA-SCNC: 1 MMOL/L — LOW (ref 1.15–1.33)
CA-I BLDA-SCNC: 1.09 MMOL/L — LOW (ref 1.15–1.33)
CA-I BLDA-SCNC: 1.18 MMOL/L — SIGNIFICANT CHANGE UP (ref 1.15–1.33)
CA-I BLDA-SCNC: 1.21 MMOL/L — SIGNIFICANT CHANGE UP (ref 1.15–1.33)
CA-I BLDA-SCNC: 1.22 MMOL/L — SIGNIFICANT CHANGE UP (ref 1.15–1.33)
CA-I SERPL-SCNC: 0.98 MMOL/L — LOW (ref 1.15–1.33)
CALCIUM SERPL-MCNC: 8 MG/DL — LOW (ref 8.4–10.5)
CALCIUM SERPL-MCNC: 8.2 MG/DL — LOW (ref 8.4–10.5)
CALCIUM SERPL-MCNC: 8.2 MG/DL — LOW (ref 8.4–10.5)
CALCIUM SERPL-MCNC: 8.6 MG/DL — SIGNIFICANT CHANGE UP (ref 8.4–10.5)
CHLORIDE SERPL-SCNC: 108 MMOL/L — SIGNIFICANT CHANGE UP (ref 96–108)
CHLORIDE SERPL-SCNC: 109 MMOL/L — HIGH (ref 96–108)
CHLORIDE SERPL-SCNC: 109 MMOL/L — HIGH (ref 96–108)
CHLORIDE SERPL-SCNC: 110 MMOL/L — HIGH (ref 96–108)
CO2 BLDA-SCNC: 23 MMOL/L — SIGNIFICANT CHANGE UP (ref 19–24)
CO2 BLDA-SCNC: 24 MMOL/L — SIGNIFICANT CHANGE UP (ref 19–24)
CO2 BLDA-SCNC: 25 MMOL/L — HIGH (ref 19–24)
CO2 BLDA-SCNC: 25 MMOL/L — HIGH (ref 19–24)
CO2 BLDA-SCNC: 26 MMOL/L — HIGH (ref 19–24)
CO2 BLDV-SCNC: 26.8 MMOL/L — HIGH (ref 22–26)
CO2 BLDV-SCNC: 27.3 MMOL/L — HIGH (ref 22–26)
CO2 BLDV-SCNC: 27.9 MMOL/L — HIGH (ref 22–26)
CO2 BLDV-SCNC: 28.3 MMOL/L — HIGH (ref 22–26)
CO2 SERPL-SCNC: 23 MMOL/L — SIGNIFICANT CHANGE UP (ref 22–31)
CO2 SERPL-SCNC: 24 MMOL/L — SIGNIFICANT CHANGE UP (ref 22–31)
CO2 SERPL-SCNC: 25 MMOL/L — SIGNIFICANT CHANGE UP (ref 22–31)
CO2 SERPL-SCNC: 26 MMOL/L — SIGNIFICANT CHANGE UP (ref 22–31)
COHGB MFR BLDA: 0.8 % — SIGNIFICANT CHANGE UP
COHGB MFR BLDA: 0.8 % — SIGNIFICANT CHANGE UP
COHGB MFR BLDA: 0.9 % — SIGNIFICANT CHANGE UP
COHGB MFR BLDA: 1 % — SIGNIFICANT CHANGE UP
COHGB MFR BLDA: 1.2 % — SIGNIFICANT CHANGE UP
COHGB MFR BLDA: 1.2 % — SIGNIFICANT CHANGE UP
COHGB MFR BLDV: 1.3 % — SIGNIFICANT CHANGE UP
CREAT SERPL-MCNC: 0.86 MG/DL — SIGNIFICANT CHANGE UP (ref 0.5–1.3)
CREAT SERPL-MCNC: 0.87 MG/DL — SIGNIFICANT CHANGE UP (ref 0.5–1.3)
CREAT SERPL-MCNC: 0.89 MG/DL — SIGNIFICANT CHANGE UP (ref 0.5–1.3)
CREAT SERPL-MCNC: 0.96 MG/DL — SIGNIFICANT CHANGE UP (ref 0.5–1.3)
EGFR: 101 ML/MIN/1.73M2 — SIGNIFICANT CHANGE UP
EGFR: 101 ML/MIN/1.73M2 — SIGNIFICANT CHANGE UP
EGFR: 102 ML/MIN/1.73M2 — SIGNIFICANT CHANGE UP
EGFR: 93 ML/MIN/1.73M2 — SIGNIFICANT CHANGE UP
EOSINOPHIL # BLD AUTO: 0 K/UL — SIGNIFICANT CHANGE UP (ref 0–0.5)
EOSINOPHIL # BLD AUTO: 0.16 K/UL — SIGNIFICANT CHANGE UP (ref 0–0.5)
EOSINOPHIL NFR BLD AUTO: 0 % — SIGNIFICANT CHANGE UP (ref 0–6)
EOSINOPHIL NFR BLD AUTO: 2.6 % — SIGNIFICANT CHANGE UP (ref 0–6)
GAS PNL BLDA: SIGNIFICANT CHANGE UP
GAS PNL BLDV: 137 MMOL/L — SIGNIFICANT CHANGE UP (ref 136–145)
GAS PNL BLDV: SIGNIFICANT CHANGE UP
GLUCOSE BLDA-MCNC: 109 MG/DL — HIGH (ref 70–99)
GLUCOSE BLDA-MCNC: 114 MG/DL — HIGH (ref 70–99)
GLUCOSE BLDA-MCNC: 139 MG/DL — HIGH (ref 70–99)
GLUCOSE BLDA-MCNC: 156 MG/DL — HIGH (ref 70–99)
GLUCOSE BLDA-MCNC: 162 MG/DL — HIGH (ref 70–99)
GLUCOSE BLDA-MCNC: 173 MG/DL — HIGH (ref 70–99)
GLUCOSE BLDA-MCNC: 183 MG/DL — HIGH (ref 70–99)
GLUCOSE BLDA-MCNC: 186 MG/DL — HIGH (ref 70–99)
GLUCOSE BLDA-MCNC: 86 MG/DL — SIGNIFICANT CHANGE UP (ref 70–99)
GLUCOSE BLDA-MCNC: 90 MG/DL — SIGNIFICANT CHANGE UP (ref 70–99)
GLUCOSE BLDC GLUCOMTR-MCNC: 122 MG/DL — HIGH (ref 70–99)
GLUCOSE BLDV-MCNC: 112 MG/DL — HIGH (ref 70–99)
GLUCOSE SERPL-MCNC: 144 MG/DL — HIGH (ref 70–99)
GLUCOSE SERPL-MCNC: 149 MG/DL — HIGH (ref 70–99)
GLUCOSE SERPL-MCNC: 170 MG/DL — HIGH (ref 70–99)
GLUCOSE SERPL-MCNC: 89 MG/DL — SIGNIFICANT CHANGE UP (ref 70–99)
HCO3 BLDA-SCNC: 22 MMOL/L — SIGNIFICANT CHANGE UP (ref 21–28)
HCO3 BLDA-SCNC: 23 MMOL/L — SIGNIFICANT CHANGE UP (ref 21–28)
HCO3 BLDA-SCNC: 24 MMOL/L — SIGNIFICANT CHANGE UP (ref 21–28)
HCO3 BLDA-SCNC: 24 MMOL/L — SIGNIFICANT CHANGE UP (ref 21–28)
HCO3 BLDA-SCNC: 25 MMOL/L — SIGNIFICANT CHANGE UP (ref 21–28)
HCO3 BLDV-SCNC: 25 MMOL/L — SIGNIFICANT CHANGE UP (ref 22–29)
HCO3 BLDV-SCNC: 26 MMOL/L — SIGNIFICANT CHANGE UP (ref 22–29)
HCO3 BLDV-SCNC: 26 MMOL/L — SIGNIFICANT CHANGE UP (ref 22–29)
HCO3 BLDV-SCNC: 27 MMOL/L — SIGNIFICANT CHANGE UP (ref 22–29)
HCT VFR BLD CALC: 27.1 % — LOW (ref 39–50)
HCT VFR BLD CALC: 27.5 % — LOW (ref 39–50)
HCT VFR BLD CALC: 28.5 % — LOW (ref 39–50)
HCT VFR BLD CALC: 37.5 % — LOW (ref 39–50)
HCT VFR BLDA CALC: 33 % — SIGNIFICANT CHANGE UP
HGB BLD CALC-MCNC: 11.1 G/DL — LOW (ref 12.6–17.4)
HGB BLD-MCNC: 12.6 G/DL — LOW (ref 13–17)
HGB BLD-MCNC: 9.2 G/DL — LOW (ref 13–17)
HGB BLD-MCNC: 9.3 G/DL — LOW (ref 13–17)
HGB BLD-MCNC: 9.7 G/DL — LOW (ref 13–17)
HGB BLDA-MCNC: 10 G/DL — LOW (ref 12.6–17.4)
HGB BLDA-MCNC: 10.6 G/DL — LOW (ref 12.6–17.4)
HGB BLDA-MCNC: 10.9 G/DL — LOW (ref 12.6–17.4)
HGB BLDA-MCNC: 10.9 G/DL — LOW (ref 12.6–17.4)
HGB BLDA-MCNC: 11.2 G/DL — LOW (ref 12.6–17.4)
HGB BLDA-MCNC: 11.5 G/DL — LOW (ref 12.6–17.4)
HGB BLDA-MCNC: 12.3 G/DL — LOW (ref 12.6–17.4)
HGB BLDA-MCNC: 7.6 G/DL — LOW (ref 12.6–17.4)
HGB BLDA-MCNC: 9.1 G/DL — LOW (ref 12.6–17.4)
HGB BLDA-MCNC: 9.3 G/DL — LOW (ref 12.6–17.4)
IMM GRANULOCYTES NFR BLD AUTO: 0.2 % — SIGNIFICANT CHANGE UP (ref 0–0.9)
INR BLD: 1.02 — SIGNIFICANT CHANGE UP (ref 0.88–1.16)
INR BLD: 1.04 — SIGNIFICANT CHANGE UP (ref 0.88–1.16)
INR BLD: 1.07 — SIGNIFICANT CHANGE UP (ref 0.88–1.16)
INR BLD: 1.12 — SIGNIFICANT CHANGE UP (ref 0.88–1.16)
ISTAT ARTERIAL BE: 1 MMOL/L — SIGNIFICANT CHANGE UP (ref -2–3)
ISTAT ARTERIAL GLUCOSE: 147 MG/DL — HIGH (ref 70–99)
ISTAT ARTERIAL HCO3: 27 MMOL/L — HIGH (ref 22–26)
ISTAT ARTERIAL HEMATOCRIT: 24 % — LOW (ref 39–50)
ISTAT ARTERIAL HEMOGLOBIN: 8.2 G/DL — LOW (ref 13–17)
ISTAT ARTERIAL IONIZED CALCIUM: 1.22 MMOL/L — SIGNIFICANT CHANGE UP (ref 1.12–1.3)
ISTAT ARTERIAL PCO2: 48 MMHG — HIGH (ref 35–45)
ISTAT ARTERIAL PH: 7.36 — SIGNIFICANT CHANGE UP (ref 7.35–7.45)
ISTAT ARTERIAL PO2: 123 MMHG — HIGH (ref 80–105)
ISTAT ARTERIAL POTASSIUM: 4.4 MMOL/L — SIGNIFICANT CHANGE UP (ref 3.5–5.3)
ISTAT ARTERIAL SO2: 99 % — HIGH (ref 95–98)
ISTAT ARTERIAL SODIUM: 142 MMOL/L — SIGNIFICANT CHANGE UP (ref 135–145)
ISTAT ARTERIAL TCO2: 28 MMOL/L — SIGNIFICANT CHANGE UP (ref 22–31)
LACTATE SERPL-SCNC: 1.1 MMOL/L — SIGNIFICANT CHANGE UP (ref 0.5–2)
LACTATE SERPL-SCNC: 1.2 MMOL/L — SIGNIFICANT CHANGE UP (ref 0.5–2)
LACTATE SERPL-SCNC: 1.2 MMOL/L — SIGNIFICANT CHANGE UP (ref 0.5–2)
LYMPHOCYTES # BLD AUTO: 0.2 K/UL — LOW (ref 1–3.3)
LYMPHOCYTES # BLD AUTO: 1.17 K/UL — SIGNIFICANT CHANGE UP (ref 1–3.3)
LYMPHOCYTES # BLD AUTO: 19.1 % — SIGNIFICANT CHANGE UP (ref 13–44)
LYMPHOCYTES # BLD AUTO: 2.6 % — LOW (ref 13–44)
MAGNESIUM SERPL-MCNC: 1.9 MG/DL — SIGNIFICANT CHANGE UP (ref 1.6–2.6)
MAGNESIUM SERPL-MCNC: 2.1 MG/DL — SIGNIFICANT CHANGE UP (ref 1.6–2.6)
MAGNESIUM SERPL-MCNC: 2.5 MG/DL — SIGNIFICANT CHANGE UP (ref 1.6–2.6)
MAGNESIUM SERPL-MCNC: 2.7 MG/DL — HIGH (ref 1.6–2.6)
MANUAL SMEAR VERIFICATION: SIGNIFICANT CHANGE UP
MCHC RBC-ENTMCNC: 31 PG — SIGNIFICANT CHANGE UP (ref 27–34)
MCHC RBC-ENTMCNC: 31.1 PG — SIGNIFICANT CHANGE UP (ref 27–34)
MCHC RBC-ENTMCNC: 31.3 PG — SIGNIFICANT CHANGE UP (ref 27–34)
MCHC RBC-ENTMCNC: 31.3 PG — SIGNIFICANT CHANGE UP (ref 27–34)
MCHC RBC-ENTMCNC: 33.5 GM/DL — SIGNIFICANT CHANGE UP (ref 32–36)
MCHC RBC-ENTMCNC: 33.6 GM/DL — SIGNIFICANT CHANGE UP (ref 32–36)
MCHC RBC-ENTMCNC: 34 GM/DL — SIGNIFICANT CHANGE UP (ref 32–36)
MCHC RBC-ENTMCNC: 34.3 GM/DL — SIGNIFICANT CHANGE UP (ref 32–36)
MCV RBC AUTO: 91.2 FL — SIGNIFICANT CHANGE UP (ref 80–100)
MCV RBC AUTO: 91.9 FL — SIGNIFICANT CHANGE UP (ref 80–100)
MCV RBC AUTO: 92.6 FL — SIGNIFICANT CHANGE UP (ref 80–100)
MCV RBC AUTO: 92.6 FL — SIGNIFICANT CHANGE UP (ref 80–100)
METHGB MFR BLDA: 0.3 % — SIGNIFICANT CHANGE UP
METHGB MFR BLDA: 0.3 % — SIGNIFICANT CHANGE UP
METHGB MFR BLDA: 0.5 % — SIGNIFICANT CHANGE UP
METHGB MFR BLDA: 0.5 % — SIGNIFICANT CHANGE UP
METHGB MFR BLDA: 0.6 % — SIGNIFICANT CHANGE UP
METHGB MFR BLDA: 0.8 % — SIGNIFICANT CHANGE UP
METHGB MFR BLDV: 0.9 % — SIGNIFICANT CHANGE UP
MONOCYTES # BLD AUTO: 0 K/UL — SIGNIFICANT CHANGE UP (ref 0–0.9)
MONOCYTES # BLD AUTO: 0.61 K/UL — SIGNIFICANT CHANGE UP (ref 0–0.9)
MONOCYTES NFR BLD AUTO: 0 % — LOW (ref 2–14)
MONOCYTES NFR BLD AUTO: 10 % — SIGNIFICANT CHANGE UP (ref 2–14)
NEUTROPHILS # BLD AUTO: 4.14 K/UL — SIGNIFICANT CHANGE UP (ref 1.8–7.4)
NEUTROPHILS # BLD AUTO: 7.47 K/UL — HIGH (ref 1.8–7.4)
NEUTROPHILS NFR BLD AUTO: 67.6 % — SIGNIFICANT CHANGE UP (ref 43–77)
NEUTROPHILS NFR BLD AUTO: 97.4 % — HIGH (ref 43–77)
NRBC # BLD: 0 /100 WBCS — SIGNIFICANT CHANGE UP (ref 0–0)
OVALOCYTES BLD QL SMEAR: SLIGHT — SIGNIFICANT CHANGE UP
OXYHGB MFR BLDA: 97.6 % — HIGH (ref 90–95)
OXYHGB MFR BLDA: 97.7 % — HIGH (ref 90–95)
OXYHGB MFR BLDA: 97.7 % — HIGH (ref 90–95)
OXYHGB MFR BLDA: 97.8 % — HIGH (ref 90–95)
OXYHGB MFR BLDA: 97.8 % — HIGH (ref 90–95)
OXYHGB MFR BLDA: 97.9 % — HIGH (ref 90–95)
OXYHGB MFR BLDA: 98 % — HIGH (ref 90–95)
OXYHGB MFR BLDA: 98.6 % — HIGH (ref 90–95)
PCO2 BLDA: 38 MMHG — SIGNIFICANT CHANGE UP (ref 35–48)
PCO2 BLDA: 38 MMHG — SIGNIFICANT CHANGE UP (ref 35–48)
PCO2 BLDA: 41 MMHG — SIGNIFICANT CHANGE UP (ref 35–48)
PCO2 BLDA: 42 MMHG — SIGNIFICANT CHANGE UP (ref 35–48)
PCO2 BLDA: 42 MMHG — SIGNIFICANT CHANGE UP (ref 35–48)
PCO2 BLDA: 44 MMHG — SIGNIFICANT CHANGE UP (ref 35–48)
PCO2 BLDA: 46 MMHG — SIGNIFICANT CHANGE UP (ref 35–48)
PCO2 BLDA: 46 MMHG — SIGNIFICANT CHANGE UP (ref 35–48)
PCO2 BLDA: 48 MMHG — SIGNIFICANT CHANGE UP (ref 35–48)
PCO2 BLDA: 49 MMHG — HIGH (ref 35–48)
PCO2 BLDV: 45 MMHG — SIGNIFICANT CHANGE UP (ref 42–55)
PCO2 BLDV: 49 MMHG — SIGNIFICANT CHANGE UP (ref 42–55)
PCO2 BLDV: 51 MMHG — SIGNIFICANT CHANGE UP (ref 42–55)
PCO2 BLDV: 53 MMHG — SIGNIFICANT CHANGE UP (ref 42–55)
PH BLDA: 7.3 — LOW (ref 7.35–7.45)
PH BLDA: 7.31 — LOW (ref 7.35–7.45)
PH BLDA: 7.32 — LOW (ref 7.35–7.45)
PH BLDA: 7.32 — LOW (ref 7.35–7.45)
PH BLDA: 7.33 — LOW (ref 7.35–7.45)
PH BLDA: 7.34 — LOW (ref 7.35–7.45)
PH BLDA: 7.34 — LOW (ref 7.35–7.45)
PH BLDA: 7.38 — SIGNIFICANT CHANGE UP (ref 7.35–7.45)
PH BLDA: 7.4 — SIGNIFICANT CHANGE UP (ref 7.35–7.45)
PH BLDA: 7.41 — SIGNIFICANT CHANGE UP (ref 7.35–7.45)
PH BLDV: 7.31 — LOW (ref 7.32–7.43)
PH BLDV: 7.32 — SIGNIFICANT CHANGE UP (ref 7.32–7.43)
PH BLDV: 7.33 — SIGNIFICANT CHANGE UP (ref 7.32–7.43)
PH BLDV: 7.36 — SIGNIFICANT CHANGE UP (ref 7.32–7.43)
PHOSPHATE SERPL-MCNC: 3.2 MG/DL — SIGNIFICANT CHANGE UP (ref 2.5–4.5)
PHOSPHATE SERPL-MCNC: 3.9 MG/DL — SIGNIFICANT CHANGE UP (ref 2.5–4.5)
PHOSPHATE SERPL-MCNC: 4 MG/DL — SIGNIFICANT CHANGE UP (ref 2.5–4.5)
PHOSPHATE SERPL-MCNC: 4.9 MG/DL — HIGH (ref 2.5–4.5)
PLAT MORPH BLD: NORMAL — SIGNIFICANT CHANGE UP
PLATELET # BLD AUTO: 118 K/UL — LOW (ref 150–400)
PLATELET # BLD AUTO: 134 K/UL — LOW (ref 150–400)
PLATELET # BLD AUTO: 140 K/UL — LOW (ref 150–400)
PLATELET # BLD AUTO: 159 K/UL — SIGNIFICANT CHANGE UP (ref 150–400)
PO2 BLDA: 328 MMHG — HIGH (ref 83–108)
PO2 BLDA: 367 MMHG — HIGH (ref 83–108)
PO2 BLDA: 367 MMHG — HIGH (ref 83–108)
PO2 BLDA: 386 MMHG — HIGH (ref 83–108)
PO2 BLDA: 394 MMHG — HIGH (ref 83–108)
PO2 BLDA: 423 MMHG — HIGH (ref 83–108)
PO2 BLDA: 441 MMHG — HIGH (ref 83–108)
PO2 BLDA: 449 MMHG — HIGH (ref 83–108)
PO2 BLDA: 499 MMHG — HIGH (ref 83–108)
PO2 BLDA: >614 MMHG — HIGH (ref 83–108)
PO2 BLDV: 41 MMHG — SIGNIFICANT CHANGE UP (ref 25–45)
PO2 BLDV: 45 MMHG — SIGNIFICANT CHANGE UP (ref 25–45)
PO2 BLDV: 46 MMHG — HIGH (ref 25–45)
PO2 BLDV: 70 MMHG — HIGH (ref 25–45)
POIKILOCYTOSIS BLD QL AUTO: SLIGHT — SIGNIFICANT CHANGE UP
POTASSIUM BLDA-SCNC: 3.6 MMOL/L — SIGNIFICANT CHANGE UP (ref 3.5–5.1)
POTASSIUM BLDA-SCNC: 4.1 MMOL/L — SIGNIFICANT CHANGE UP (ref 3.5–5.1)
POTASSIUM BLDA-SCNC: 4.8 MMOL/L — SIGNIFICANT CHANGE UP (ref 3.5–5.1)
POTASSIUM BLDA-SCNC: 4.8 MMOL/L — SIGNIFICANT CHANGE UP (ref 3.5–5.1)
POTASSIUM BLDA-SCNC: 5.2 MMOL/L — HIGH (ref 3.5–5.1)
POTASSIUM BLDA-SCNC: 5.2 MMOL/L — HIGH (ref 3.5–5.1)
POTASSIUM BLDA-SCNC: 5.6 MMOL/L — HIGH (ref 3.5–5.1)
POTASSIUM BLDA-SCNC: 5.7 MMOL/L — HIGH (ref 3.5–5.1)
POTASSIUM BLDA-SCNC: 5.7 MMOL/L — HIGH (ref 3.5–5.1)
POTASSIUM BLDA-SCNC: 5.9 MMOL/L — HIGH (ref 3.5–5.1)
POTASSIUM BLDV-SCNC: 5.5 MMOL/L — HIGH (ref 3.5–5.1)
POTASSIUM SERPL-MCNC: 3.9 MMOL/L — SIGNIFICANT CHANGE UP (ref 3.5–5.3)
POTASSIUM SERPL-MCNC: 4.5 MMOL/L — SIGNIFICANT CHANGE UP (ref 3.5–5.3)
POTASSIUM SERPL-MCNC: 4.6 MMOL/L — SIGNIFICANT CHANGE UP (ref 3.5–5.3)
POTASSIUM SERPL-MCNC: 4.7 MMOL/L — SIGNIFICANT CHANGE UP (ref 3.5–5.3)
POTASSIUM SERPL-SCNC: 3.9 MMOL/L — SIGNIFICANT CHANGE UP (ref 3.5–5.3)
POTASSIUM SERPL-SCNC: 4.5 MMOL/L — SIGNIFICANT CHANGE UP (ref 3.5–5.3)
POTASSIUM SERPL-SCNC: 4.6 MMOL/L — SIGNIFICANT CHANGE UP (ref 3.5–5.3)
POTASSIUM SERPL-SCNC: 4.7 MMOL/L — SIGNIFICANT CHANGE UP (ref 3.5–5.3)
PROT SERPL-MCNC: 4.7 G/DL — LOW (ref 6–8.3)
PROT SERPL-MCNC: 5 G/DL — LOW (ref 6–8.3)
PROT SERPL-MCNC: 5.1 G/DL — LOW (ref 6–8.3)
PROT SERPL-MCNC: 5.9 G/DL — LOW (ref 6–8.3)
PROTHROM AB SERPL-ACNC: 12.2 SEC — SIGNIFICANT CHANGE UP (ref 10.5–13.4)
PROTHROM AB SERPL-ACNC: 12.4 SEC — SIGNIFICANT CHANGE UP (ref 10.5–13.4)
PROTHROM AB SERPL-ACNC: 12.8 SEC — SIGNIFICANT CHANGE UP (ref 10.5–13.4)
PROTHROM AB SERPL-ACNC: 13.3 SEC — SIGNIFICANT CHANGE UP (ref 10.5–13.4)
RBC # BLD: 2.97 M/UL — LOW (ref 4.2–5.8)
RBC # BLD: 2.97 M/UL — LOW (ref 4.2–5.8)
RBC # BLD: 3.1 M/UL — LOW (ref 4.2–5.8)
RBC # BLD: 4.05 M/UL — LOW (ref 4.2–5.8)
RBC # FLD: 13.1 % — SIGNIFICANT CHANGE UP (ref 10.3–14.5)
RBC BLD AUTO: ABNORMAL
SAO2 % BLDA: 99 % — HIGH (ref 94–98)
SAO2 % BLDA: 99.1 % — HIGH (ref 94–98)
SAO2 % BLDA: 99.2 % — HIGH (ref 94–98)
SAO2 % BLDA: 99.2 % — HIGH (ref 94–98)
SAO2 % BLDA: 99.4 % — HIGH (ref 94–98)
SAO2 % BLDA: 99.6 % — HIGH (ref 94–98)
SAO2 % BLDA: 99.7 % — HIGH (ref 94–98)
SAO2 % BLDA: 99.8 % — HIGH (ref 94–98)
SAO2 % BLDV: 65.1 % — LOW (ref 67–88)
SAO2 % BLDV: 71.7 % — SIGNIFICANT CHANGE UP (ref 67–88)
SAO2 % BLDV: 73.5 % — SIGNIFICANT CHANGE UP (ref 67–88)
SAO2 % BLDV: 95 % — HIGH (ref 67–88)
SCHISTOCYTES BLD QL AUTO: SLIGHT — SIGNIFICANT CHANGE UP
SODIUM BLDA-SCNC: 136 MMOL/L — SIGNIFICANT CHANGE UP (ref 136–145)
SODIUM BLDA-SCNC: 136 MMOL/L — SIGNIFICANT CHANGE UP (ref 136–145)
SODIUM BLDA-SCNC: 137 MMOL/L — SIGNIFICANT CHANGE UP (ref 136–145)
SODIUM BLDA-SCNC: 138 MMOL/L — SIGNIFICANT CHANGE UP (ref 136–145)
SODIUM BLDA-SCNC: 138 MMOL/L — SIGNIFICANT CHANGE UP (ref 136–145)
SODIUM BLDA-SCNC: 139 MMOL/L — SIGNIFICANT CHANGE UP (ref 136–145)
SODIUM BLDA-SCNC: 140 MMOL/L — SIGNIFICANT CHANGE UP (ref 136–145)
SODIUM BLDA-SCNC: 141 MMOL/L — SIGNIFICANT CHANGE UP (ref 136–145)
SODIUM SERPL-SCNC: 141 MMOL/L — SIGNIFICANT CHANGE UP (ref 135–145)
SODIUM SERPL-SCNC: 142 MMOL/L — SIGNIFICANT CHANGE UP (ref 135–145)
SODIUM SERPL-SCNC: 142 MMOL/L — SIGNIFICANT CHANGE UP (ref 135–145)
SODIUM SERPL-SCNC: 143 MMOL/L — SIGNIFICANT CHANGE UP (ref 135–145)
WBC # BLD: 6.12 K/UL — SIGNIFICANT CHANGE UP (ref 3.8–10.5)
WBC # BLD: 7.67 K/UL — SIGNIFICANT CHANGE UP (ref 3.8–10.5)
WBC # BLD: 8.83 K/UL — SIGNIFICANT CHANGE UP (ref 3.8–10.5)
WBC # BLD: 9.21 K/UL — SIGNIFICANT CHANGE UP (ref 3.8–10.5)
WBC # FLD AUTO: 6.12 K/UL — SIGNIFICANT CHANGE UP (ref 3.8–10.5)
WBC # FLD AUTO: 7.67 K/UL — SIGNIFICANT CHANGE UP (ref 3.8–10.5)
WBC # FLD AUTO: 8.83 K/UL — SIGNIFICANT CHANGE UP (ref 3.8–10.5)
WBC # FLD AUTO: 9.21 K/UL — SIGNIFICANT CHANGE UP (ref 3.8–10.5)

## 2023-03-16 PROCEDURE — 99292 CRITICAL CARE ADDL 30 MIN: CPT

## 2023-03-16 PROCEDURE — 71045 X-RAY EXAM CHEST 1 VIEW: CPT | Mod: 26

## 2023-03-16 PROCEDURE — 88305 TISSUE EXAM BY PATHOLOGIST: CPT | Mod: 26

## 2023-03-16 PROCEDURE — 93010 ELECTROCARDIOGRAM REPORT: CPT

## 2023-03-16 PROCEDURE — 99291 CRITICAL CARE FIRST HOUR: CPT

## 2023-03-16 PROCEDURE — 33864 ASCENDING AORTIC GRAFT: CPT | Mod: AS

## 2023-03-16 PROCEDURE — 33866 AORTIC HEMIARCH GRAFT: CPT

## 2023-03-16 PROCEDURE — 33866 AORTIC HEMIARCH GRAFT: CPT | Mod: AS

## 2023-03-16 PROCEDURE — 33864 ASCENDING AORTIC GRAFT: CPT

## 2023-03-16 RX ORDER — ASPIRIN/CALCIUM CARB/MAGNESIUM 324 MG
81 TABLET ORAL DAILY
Refills: 0 | Status: DISCONTINUED | OUTPATIENT
Start: 2023-03-17 | End: 2023-03-21

## 2023-03-16 RX ORDER — SENNA PLUS 8.6 MG/1
2 TABLET ORAL AT BEDTIME
Refills: 0 | Status: DISCONTINUED | OUTPATIENT
Start: 2023-03-16 | End: 2023-03-16

## 2023-03-16 RX ORDER — ATORVASTATIN CALCIUM 80 MG/1
80 TABLET, FILM COATED ORAL AT BEDTIME
Refills: 0 | Status: DISCONTINUED | OUTPATIENT
Start: 2023-03-16 | End: 2023-03-21

## 2023-03-16 RX ORDER — SODIUM CHLORIDE 9 MG/ML
1000 INJECTION INTRAMUSCULAR; INTRAVENOUS; SUBCUTANEOUS
Refills: 0 | Status: DISCONTINUED | OUTPATIENT
Start: 2023-03-16 | End: 2023-03-21

## 2023-03-16 RX ORDER — FENTANYL CITRATE 50 UG/ML
12.5 INJECTION INTRAVENOUS
Refills: 0 | Status: DISCONTINUED | OUTPATIENT
Start: 2023-03-16 | End: 2023-03-17

## 2023-03-16 RX ORDER — PROPOFOL 10 MG/ML
10 INJECTION, EMULSION INTRAVENOUS
Qty: 1000 | Refills: 0 | Status: DISCONTINUED | OUTPATIENT
Start: 2023-03-16 | End: 2023-03-16

## 2023-03-16 RX ORDER — MEPERIDINE HYDROCHLORIDE 50 MG/ML
25 INJECTION INTRAMUSCULAR; INTRAVENOUS; SUBCUTANEOUS ONCE
Refills: 0 | Status: DISCONTINUED | OUTPATIENT
Start: 2023-03-16 | End: 2023-03-16

## 2023-03-16 RX ORDER — PANTOPRAZOLE SODIUM 20 MG/1
40 TABLET, DELAYED RELEASE ORAL DAILY
Refills: 0 | Status: DISCONTINUED | OUTPATIENT
Start: 2023-03-16 | End: 2023-03-16

## 2023-03-16 RX ORDER — PANTOPRAZOLE SODIUM 20 MG/1
40 TABLET, DELAYED RELEASE ORAL
Refills: 0 | Status: DISCONTINUED | OUTPATIENT
Start: 2023-03-16 | End: 2023-03-21

## 2023-03-16 RX ORDER — SENNA PLUS 8.6 MG/1
2 TABLET ORAL AT BEDTIME
Refills: 0 | Status: DISCONTINUED | OUTPATIENT
Start: 2023-03-16 | End: 2023-03-21

## 2023-03-16 RX ORDER — DEXTROSE 50 % IN WATER 50 %
25 SYRINGE (ML) INTRAVENOUS
Refills: 0 | Status: DISCONTINUED | OUTPATIENT
Start: 2023-03-16 | End: 2023-03-17

## 2023-03-16 RX ORDER — POLYETHYLENE GLYCOL 3350 17 G/17G
17 POWDER, FOR SOLUTION ORAL DAILY
Refills: 0 | Status: DISCONTINUED | OUTPATIENT
Start: 2023-03-16 | End: 2023-03-21

## 2023-03-16 RX ORDER — ACETAMINOPHEN 500 MG
1000 TABLET ORAL ONCE
Refills: 0 | Status: COMPLETED | OUTPATIENT
Start: 2023-03-16 | End: 2023-03-16

## 2023-03-16 RX ORDER — CHLORHEXIDINE GLUCONATE 213 G/1000ML
1 SOLUTION TOPICAL
Refills: 0 | Status: DISCONTINUED | OUTPATIENT
Start: 2023-03-16 | End: 2023-03-19

## 2023-03-16 RX ORDER — NOREPINEPHRINE BITARTRATE/D5W 8 MG/250ML
0.05 PLASTIC BAG, INJECTION (ML) INTRAVENOUS
Qty: 8 | Refills: 0 | Status: DISCONTINUED | OUTPATIENT
Start: 2023-03-16 | End: 2023-03-17

## 2023-03-16 RX ORDER — DEXTROSE 50 % IN WATER 50 %
50 SYRINGE (ML) INTRAVENOUS
Refills: 0 | Status: DISCONTINUED | OUTPATIENT
Start: 2023-03-16 | End: 2023-03-17

## 2023-03-16 RX ORDER — OXYCODONE HYDROCHLORIDE 5 MG/1
10 TABLET ORAL EVERY 6 HOURS
Refills: 0 | Status: DISCONTINUED | OUTPATIENT
Start: 2023-03-16 | End: 2023-03-21

## 2023-03-16 RX ORDER — CHLORHEXIDINE GLUCONATE 213 G/1000ML
15 SOLUTION TOPICAL EVERY 12 HOURS
Refills: 0 | Status: DISCONTINUED | OUTPATIENT
Start: 2023-03-16 | End: 2023-03-16

## 2023-03-16 RX ORDER — CEFAZOLIN SODIUM 1 G
2000 VIAL (EA) INJECTION EVERY 8 HOURS
Refills: 0 | Status: COMPLETED | OUTPATIENT
Start: 2023-03-16 | End: 2023-03-18

## 2023-03-16 RX ORDER — HEPARIN SODIUM 5000 [USP'U]/ML
5000 INJECTION INTRAVENOUS; SUBCUTANEOUS EVERY 8 HOURS
Refills: 0 | Status: DISCONTINUED | OUTPATIENT
Start: 2023-03-16 | End: 2023-03-21

## 2023-03-16 RX ORDER — INSULIN HUMAN 100 [IU]/ML
1 INJECTION, SOLUTION SUBCUTANEOUS
Qty: 50 | Refills: 0 | Status: DISCONTINUED | OUTPATIENT
Start: 2023-03-16 | End: 2023-03-17

## 2023-03-16 RX ORDER — CHLORHEXIDINE GLUCONATE 213 G/1000ML
5 SOLUTION TOPICAL
Refills: 0 | Status: DISCONTINUED | OUTPATIENT
Start: 2023-03-16 | End: 2023-03-16

## 2023-03-16 RX ORDER — CLEVIDIPINE BUTYRATE 50MG/100ML
1 VIAL (ML) INTRAVENOUS
Qty: 25 | Refills: 0 | Status: DISCONTINUED | OUTPATIENT
Start: 2023-03-16 | End: 2023-03-16

## 2023-03-16 RX ORDER — OXYCODONE HYDROCHLORIDE 5 MG/1
5 TABLET ORAL EVERY 6 HOURS
Refills: 0 | Status: DISCONTINUED | OUTPATIENT
Start: 2023-03-16 | End: 2023-03-21

## 2023-03-16 RX ORDER — DEXMEDETOMIDINE HYDROCHLORIDE IN 0.9% SODIUM CHLORIDE 4 UG/ML
0.2 INJECTION INTRAVENOUS
Qty: 400 | Refills: 0 | Status: DISCONTINUED | OUTPATIENT
Start: 2023-03-16 | End: 2023-03-16

## 2023-03-16 RX ADMIN — Medication 100 MILLIGRAM(S): at 19:44

## 2023-03-16 RX ADMIN — Medication 400 MILLIGRAM(S): at 19:30

## 2023-03-16 RX ADMIN — HEPARIN SODIUM 5000 UNIT(S): 5000 INJECTION INTRAVENOUS; SUBCUTANEOUS at 21:55

## 2023-03-16 RX ADMIN — CHLORHEXIDINE GLUCONATE 1 APPLICATION(S): 213 SOLUTION TOPICAL at 05:39

## 2023-03-16 RX ADMIN — Medication 1000 MILLIGRAM(S): at 20:04

## 2023-03-16 RX ADMIN — FENTANYL CITRATE 12.5 MICROGRAM(S): 50 INJECTION INTRAVENOUS at 22:55

## 2023-03-16 RX ADMIN — FENTANYL CITRATE 12.5 MICROGRAM(S): 50 INJECTION INTRAVENOUS at 23:10

## 2023-03-16 RX ADMIN — CHLORHEXIDINE GLUCONATE 15 MILLILITER(S): 213 SOLUTION TOPICAL at 05:37

## 2023-03-16 NOTE — BRIEF OPERATIVE NOTE - NSICDXBRIEFPREOP_GEN_ALL_CORE_FT
PRE-OP DIAGNOSIS:  Aortic aneurysm 16-Mar-2023 15:58:11  Geronimo Bartlett  Aortic root aneurysm 16-Mar-2023 15:58:22  Geronimo Bartlett

## 2023-03-16 NOTE — BRIEF OPERATIVE NOTE - COMMENTS
I was the first assistant for this case including but not limited to sternotomy, valve sparing aortic root repair, ascending and hemiarch replacement.

## 2023-03-16 NOTE — BRIEF OPERATIVE NOTE - NSICDXBRIEFPOSTOP_GEN_ALL_CORE_FT
POST-OP DIAGNOSIS:  Aortic aneurysm 16-Mar-2023 15:58:38  Geronimo Bartlett  Aortic root aneurysm 16-Mar-2023 15:58:46  Geronimo Bartlett

## 2023-03-16 NOTE — PROGRESS NOTE ADULT - SUBJECTIVE AND OBJECTIVE BOX
CTICU  CRITICAL  CARE  attending     Hand off received 					   Pertinent clinical, laboratory, radiographic, hemodynamic, echocardiographic, respiratory data, microbiologic data and chart were reviewed and analyzed frequently throughout the course of the day  Patient seen and examined with CTS/ SH attending at bedside  Pt is a 57yr old male with PMH CAD, HTN, hiatal hernia admitted for surgical mgmt of aortic root and ascending aortic aneurysm. Patient underwent valve sparing aortic root repair with ascending and hemiarch replacement (EF nl) with Dr. Cerna 3/16. Arrived on levophed 0.02, precedex, intubated. Given 2 FFP, 1 plt, 5 cryo, 500 FEIBA, 3L IVF, 1500cc urine output.       FAMILY HISTORY:  FHx: thoracic aortic aneurysm (Sibling)  PAST MEDICAL & SURGICAL HISTORY:  H/O aortic aneurys  HTN (hypertension)  Nonobstructive cardiomyopathy  History of hip surgery  H/O knee surgery        Patient is a 57y old  Male who presents with a chief complaint of LHC prior to aortic root replacement.    14 system review was unremarkable    Vital signs, hemodynamic and respiratory parameters were reviewed from the bedside nursing flowsheet.  ICU Vital Signs Last 24 Hrs  T(C): 36.2 (16 Mar 2023 04:27), Max: 36.7 (15 Mar 2023 21:40)  T(F): 97.2 (16 Mar 2023 04:27), Max: 97.2 (16 Mar 2023 04:27)  HR: 79 (16 Mar 2023 14:45) (35 - 80)  BP: 119/77 (16 Mar 2023 05:16) (113/68 - 151/76)  BP(mean): 93 (16 Mar 2023 05:16) (93 - 110)  ABP: 95/65 (16 Mar 2023 14:45) (95/65 - 99/64)  ABP(mean): 75 (16 Mar 2023 14:45) (75 - 78)  RR: 16 (16 Mar 2023 14:45) (16 - 18)  SpO2: 98% (16 Mar 2023 14:45) (93% - 100%)    O2 Parameters below as of 16 Mar 2023 14:45  Patient On (Oxygen Delivery Method): ventilator    O2 Concentration (%): 50      Adult Advanced Hemodynamics Last 24 Hrs  CVP(mm Hg): --  CVP(cm H2O): --  CO: --  CI: --  PA: --  PA(mean): --  PCWP: --  SVR: --  SVRI: --  PVR: --  PVRI: --, ABG - ( 16 Mar 2023 14:52 )  pH, Arterial: 7.36  pH, Blood: x     /  pCO2: 46    /  pO2: 139   / HCO3: 26    / Base Excess: 0.1   /  SaO2: 98.9                Intake and output was reviewed and the fluid balance was calculated  Daily Height in cm: 180.34 (16 Mar 2023 03:52)    Daily   I&O's Summary    15 Mar 2023 07:01  -  16 Mar 2023 07:00  --------------------------------------------------------  IN: 0 mL / OUT: 0 mL / NET: 0 mL    16 Mar 2023 07:01  -  16 Mar 2023 14:57  --------------------------------------------------------  IN: 0 mL / OUT: 75 mL / NET: -75 mL        All lines and drain sites were assessed  Glycemic trend was reviewed    Neuro: intubated and sedated  HEENT: ett  Heart: s1 s2  Lungs: clear bl  Abdomen: soft nt nd  Extremities: 2+dp    Lines:  RIJ [place] with Cordis 3/16  R radial arterial line 3/16    Tubes:  Blakes x 4    labs  CBC Full  -  ( 16 Mar 2023 06:20 )  WBC Count : 6.12 K/uL  RBC Count : 4.05 M/uL  Hemoglobin : 12.6 g/dL  Hematocrit : 37.5 %  Platelet Count - Automated : 159 K/uL  Mean Cell Volume : 92.6 fl  Mean Cell Hemoglobin : 31.1 pg  Mean Cell Hemoglobin Concentration : 33.6 gm/dL  Auto Neutrophil # : 4.14 K/uL  Auto Lymphocyte # : 1.17 K/uL  Auto Monocyte # : 0.61 K/uL  Auto Eosinophil # : 0.16 K/uL  Auto Basophil # : 0.03 K/uL  Auto Neutrophil % : 67.6 %  Auto Lymphocyte % : 19.1 %  Auto Monocyte % : 10.0 %  Auto Eosinophil % : 2.6 %  Auto Basophil % : 0.5 %    03-16    142  |  109<H>  |  23  ----------------------------<  89  3.9   |  23  |  0.96    Ca    8.6      16 Mar 2023 06:20  Phos  3.9     03-16  Mg     1.9     03-16    TPro  5.9<L>  /  Alb  3.7  /  TBili  0.6  /  DBili  x   /  AST  17  /  ALT  20  /  AlkPhos  58  03-16    PT/INR - ( 16 Mar 2023 06:20 )   PT: 12.8 sec;   INR: 1.07          PTT - ( 16 Mar 2023 06:20 )  PTT:27.7 sec  The current medications were reviewed   MEDICATIONS  (STANDING):  atorvastatin 80 milliGRAM(s) Oral at bedtime  ceFAZolin   IVPB 2000 milliGRAM(s) IV Intermittent every 8 hours  chlorhexidine 0.12% Liquid 5 milliLiter(s) Oral Mucosa two times a day  chlorhexidine 0.12% Liquid 15 milliLiter(s) Oral Mucosa every 12 hours  chlorhexidine 4% Liquid 1 Application(s) Topical <User Schedule>  dextrose 50% Injectable 50 milliLiter(s) IV Push every 15 minutes  dextrose 50% Injectable 25 milliLiter(s) IV Push every 15 minutes  heparin   Injectable 5000 Unit(s) SubCutaneous every 8 hours  insulin regular Infusion 1 Unit(s)/Hr (1 mL/Hr) IV Continuous <Continuous>  meperidine     Injectable 25 milliGRAM(s) IV Push once  pantoprazole  Injectable 40 milliGRAM(s) IV Push daily  polyethylene glycol 3350 17 Gram(s) Oral daily  senna 2 Tablet(s) Oral at bedtime  sodium chloride 0.9%. 1000 milliLiter(s) (10 mL/Hr) IV Continuous <Continuous>    MEDICATIONS  (PRN):      Assessment/Plan:  57yr old male with PMH CAD, HTN, hiatal hernia admitted for surgical mgmt of aortic root and ascending aortic aneurysm.     Sp valve sparing aortic root repair with ascending and hemiarch replacement (EF nl, [place] 3/16)  Vasogenic shock on levophed-titrate to systolic 110-120  IVF resuscitation  Acute post operative mechanical ventilation-wean to extubate  Periop abx  Monitor CT output  Fu post op labs  Fu post op CXR  Replete lytes prn  GI/DVT PPX  Bowel Regimen  Pain control  Close hemodynamic, ventilatory and drain monitoring and management per post op routine  Monitor for arrhythmias and monitor parameters for organ perfusion  Beta blockade not administered due to hemodynamic instability and bradycardia  Monitor neurologic status  Head of the bed should remain elevated to 45 deg   Chest PT and IS will be encouraged  Monitor adequacy of oxygenation and ventilation and attempt to wean oxygen  Antibiotic regimen will be tailored to the clinical, laboratory and microbiologic data  Nutritional goals will be met using po eventually, ensure adequate caloric intake and monitor the same  Stress ulcer and VTE prophylaxis will be achieved    Glycemic control is satisfactory  Electrolytes have been repleted as necessary and wound care has been carried out   Pain control has been achieved.   Aggressive physical therapy and early mobility and ambulation goals will be met   The family was updated about the course and plan.    CRITICAL CARE TIME personally provided by me  in evaluation and management, reassessments, review and interpretation of labs and x-rays, ventilator and hemodynamic management, formulating a plan and coordinating care: ___90____ MIN.  Time does not include procedural time.    CTICU ATTENDING     					  Moreno Monahan MD CTICU  CRITICAL  CARE  attending     Hand off received 					   Pertinent clinical, laboratory, radiographic, hemodynamic, echocardiographic, respiratory data, microbiologic data and chart were reviewed and analyzed frequently throughout the course of the day  Patient seen and examined with CTS/ SH attending at bedside  Pt is a 57yr old male with PMH CAD, HTN, hiatal hernia admitted for surgical mgmt of aortic root and ascending aortic aneurysm. Patient underwent valve sparing aortic root repair with ascending and hemiarch replacement (EF nl) with Dr. Cerna 3/16. Arrived on levophed 0.02, precedex, intubated. Given 2 FFP, 1 plt, 5 cryo, 500 FEIBA, 3L IVF, 1500cc urine output.       FAMILY HISTORY:  FHx: thoracic aortic aneurysm (Sibling)  PAST MEDICAL & SURGICAL HISTORY:  H/O aortic aneurys  HTN (hypertension)  Nonobstructive cardiomyopathy  History of hip surgery  H/O knee surgery        Patient is a 57y old  Male who presents with a chief complaint of LHC prior to aortic root replacement.    14 system review was unremarkable    Vital signs, hemodynamic and respiratory parameters were reviewed from the bedside nursing flowsheet.  ICU Vital Signs Last 24 Hrs  T(C): 36.2 (16 Mar 2023 04:27), Max: 36.7 (15 Mar 2023 21:40)  T(F): 97.2 (16 Mar 2023 04:27), Max: 97.2 (16 Mar 2023 04:27)  HR: 79 (16 Mar 2023 14:45) (35 - 80)  BP: 119/77 (16 Mar 2023 05:16) (113/68 - 151/76)  BP(mean): 93 (16 Mar 2023 05:16) (93 - 110)  ABP: 95/65 (16 Mar 2023 14:45) (95/65 - 99/64)  ABP(mean): 75 (16 Mar 2023 14:45) (75 - 78)  RR: 16 (16 Mar 2023 14:45) (16 - 18dsfgnldfg)    SpO2: 98% (16 Mar 2023 14:45) (93% - 100%)    O2 Parameters below as of 16 Mar 2023 14:45  Patient On (Oxygen Delivery Method): ventilator    O2 Concentration (%): 50      Adult Advanced Hemodynamics Last 24 Hrs  CVP(mm Hg): --  CVP(cm H2O): --  CO: --  CI: --  PA: --  PA(mean): --  PCWP: --  SVR: --  SVRI: --  PVR: --  PVRI: --, ABG - ( 16 Mar 2023 14:52 )  pH, Arterial: 7.36  pH, Blood: x     /  pCO2: 46    /  pO2: 139   / HCO3: 26    / Base Excess: 0.1   /  SaO2: 98.9                Intake and output was reviewed and the fluid balance was calculated  Daily Height in cm: 180.34 (16 Mar 2023 03:52)    Daily   I&O's Summary    15 Mar 2023 07:01  -  16 Mar 2023 07:00  --------------------------------------------------------  IN: 0 mL / OUT: 0 mL / NET: 0 mL    16 Mar 2023 07:01  -  16 Mar 2023 14:57  --------------------------------------------------------  IN: 0 mL / OUT: 75 mL / NET: -75 mL        All lines and drain sites were assessed  Glycemic trend was reviewed    Neuro: intubated and sedated  HEENT: ett  Heart: s1 s2  Lungs: clear bl  Abdomen: soft nt nd  Extremities: 2+dp    Lines:  RIJ [place] with Cordis 3/16  R radial arterial line 3/16    Tubes:  Blakes x 4    labs  CBC Full  -  ( 16 Mar 2023 06:20 )  WBC Count : 6.12 K/uL  RBC Count : 4.05 M/uL  Hemoglobin : 12.6 g/dL  Hematocrit : 37.5 %  Platelet Count - Automated : 159 K/uL  Mean Cell Volume : 92.6 fl  Mean Cell Hemoglobin : 31.1 pg  Mean Cell Hemoglobin Concentration : 33.6 gm/dL  Auto Neutrophil # : 4.14 K/uL  Auto Lymphocyte # : 1.17 K/uL  Auto Monocyte # : 0.61 K/uL  Auto Eosinophil # : 0.16 K/uL  Auto Basophil # : 0.03 K/uL  Auto Neutrophil % : 67.6 %  Auto Lymphocyte % : 19.1 %  Auto Monocyte % : 10.0 %  Auto Eosinophil % : 2.6 %  Auto Basophil % : 0.5 %    03-16    142  |  109<H>  |  23  ----------------------------<  89  3.9   |  23  |  0.96    Ca    8.6      16 Mar 2023 06:20  Phos  3.9     03-16  Mg     1.9     03-16    TPro  5.9<L>  /  Alb  3.7  /  TBili  0.6  /  DBili  x   /  AST  17  /  ALT  20  /  AlkPhos  58  03-16    PT/INR - ( 16 Mar 2023 06:20 )   PT: 12.8 sec;   INR: 1.07          PTT - ( 16 Mar 2023 06:20 )  PTT:27.7 sec  The current medications were reviewed   MEDICATIONS  (STANDING):  atorvastatin 80 milliGRAM(s) Oral at bedtime  ceFAZolin   IVPB 2000 milliGRAM(s) IV Intermittent every 8 hours  chlorhexidine 0.12% Liquid 5 milliLiter(s) Oral Mucosa two times a day  chlorhexidine 0.12% Liquid 15 milliLiter(s) Oral Mucosa every 12 hours  chlorhexidine 4% Liquid 1 Application(s) Topical <User Schedule>  dextrose 50% Injectable 50 milliLiter(s) IV Push every 15 minutes  dextrose 50% Injectable 25 milliLiter(s) IV Push every 15 minutes  heparin   Injectable 5000 Unit(s) SubCutaneous every 8 hours  insulin regular Infusion 1 Unit(s)/Hr (1 mL/Hr) IV Continuous <Continuous>  meperidine     Injectable 25 milliGRAM(s) IV Push once  pantoprazole  Injectable 40 milliGRAM(s) IV Push daily  polyethylene glycol 3350 17 Gram(s) Oral daily  senna 2 Tablet(s) Oral at bedtime  sodium chloride 0.9%. 1000 milliLiter(s) (10 mL/Hr) IV Continuous <Continuous>    MEDICATIONS  (PRN):      Assessment/Plan:  57yr old male with PMH CAD, HTN, hiatal hernia admitted for surgical mgmt of aortic root and ascending aortic aneurysm.     Sp valve sparing aortic root repair with ascending and hemiarch replacement (EF nl, [place] 3/16)  Vasogenic shock on levophed-titrate to systolic 110-120  IVF resuscitation  Acute post operative mechanical ventilation-wean to extubate  Periop abx  Monitor CT output  Fu post op labs  Fu post op CXR  Replete lytes prn  GI/DVT PPX  Bowel Regimen  Pain control  Close hemodynamic, ventilatory and drain monitoring and management per post op routine  Monitor for arrhythmias and monitor parameters for organ perfusion  Beta blockade not administered due to hemodynamic instability and bradycardia  Monitor neurologic status  Head of the bed should remain elevated to 45 deg   Chest PT and IS will be encouraged  Monitor adequacy of oxygenation and ventilation and attempt to wean oxygen  Antibiotic regimen will be tailored to the clinical, laboratory and microbiologic data  Nutritional goals will be met using po eventually, ensure adequate caloric intake and monitor the same  Stress ulcer and VTE prophylaxis will be achieved    Glycemic control is satisfactory  Electrolytes have been repleted as necessary and wound care has been carried out   Pain control has been achieved.   Aggressive physical therapy and early mobility and ambulation goals will be met   The family was updated about the course and plan.    CRITICAL CARE TIME personally provided by me  in evaluation and management, reassessments, review and interpretation of labs and x-rays, ventilator and hemodynamic management, formulating a plan and coordinating care: ___90____ MIN.  Time does not include procedural time.    CTICU ATTENDING     					  Moreno Monahan MD

## 2023-03-16 NOTE — BRIEF OPERATIVE NOTE - NSICDXBRIEFPROCEDURE_GEN_ALL_CORE_FT
PROCEDURES:  Replacement, aortic root, valve sparing, using Lane technique, with cardiopulmonary bypass 16-Mar-2023 15:55:54   28mm valvsava graft   Bypass time: 197 mins   Aortic clamp time: 162 mins  CA: 17 mins (ACP 14mins and RCP 3 mins) Geronimo Bartlett

## 2023-03-16 NOTE — PROGRESS NOTE ADULT - SUBJECTIVE AND OBJECTIVE BOX
CTICU  CRITICAL  CARE  attending     Hand off received 					   Pertinent clinical, laboratory, radiographic, hemodynamic, echocardiographic, respiratory data, microbiologic data and chart were reviewed and analyzed frequently throughout the course of the day and night  Patient seen and examined with CTS/ SH attending at bedside    Pt is a 57y , Male, operative day s/p Valve sparing aortic root repair; ascending and haylie arch replacement    post op:    remains extubated on supplemental O2  AV paced for bradycardia  Hypotensive on pressor support    58yo M,  with +FHx aortic aneurysm (2 brothers) PMHx nonobstructive CAD, HTN, hiatal hernia presented to Dr. Cerna for evaluation of aortic root and ascending aortic aneurysm. Known TAA since 4.7cm that has recently expanded to 5.2cm. Denies chest pain, SOB, LUO, palpitations, dizziness, LOC, N/V/D, fever/chills/sick contact, diaphoresis, orthopnea/PND, and leg swelling. TTE 1/25/23: normal EF, aortic root 5.2cm, ascending aorta 4.7cm, trileaflet aortic valve with mild-mod AR. MRA 1/30/23: thoracic aortic aneurysm measuring up to 5.1cm in aortic root, mod sized hiatal hernia with intrathoracic stomach. Renal US 1/25/23: mild diffuse plaque in abdominal aorta, no renal artery stenosis. Patient presents for pre-op LHC prior to valve sparing aortic root replacement with possible AV replacement        , FAMILY HISTORY:  FHx: thoracic aortic aneurysm (Sibling)    PAST MEDICAL & SURGICAL HISTORY:  H/O aortic aneurysm      HTN (hypertension)      Nonobstructive cardiomyopathy      History of hip surgery      H/O knee surgery        Patient is a 57y old  Male who presents for aortic root replacement (16 Mar 2023 14:55)      14 system review limited 2/2 post op morbidity    Vital signs, hemodynamic and respiratory parameters were reviewed from the bedside nursing flowsheet.  ICU Vital Signs Last 24 Hrs  T(C): 36.5 (17 Mar 2023 01:23), Max: 36.5 (17 Mar 2023 01:23)  T(F): 97.7 (17 Mar 2023 01:23), Max: 97.7 (17 Mar 2023 01:23)  HR: 80 (17 Mar 2023 02:00) (44 - 82)  BP: 119/77 (16 Mar 2023 05:16) (119/77 - 150/85)  BP(mean): 93 (16 Mar 2023 05:16) (93 - 110)  ABP: 124/69 (17 Mar 2023 02:00) (93/56 - 139/86)  ABP(mean): 88 (17 Mar 2023 02:00) (68 - 106)  RR: 15 (17 Mar 2023 02:00) (14 - 20)  SpO2: 99% (17 Mar 2023 02:00) (93% - 100%)    O2 Parameters below as of 17 Mar 2023 02:00  Patient On (Oxygen Delivery Method): nasal cannula w/ humidification  O2 Flow (L/min): 4        Adult Advanced Hemodynamics Last 24 Hrs  CVP(mm Hg): 39 (17 Mar 2023 02:00) (9 - 39)  CVP(cm H2O): --  CO: 7.7 (17 Mar 2023 00:00) (5.2 - 7.7)  CI: 3.8 (17 Mar 2023 00:00) (2.6 - 3.8)  PA: 26/16 (17 Mar 2023 02:00) (23/9 - 51/19)  PA(mean): 21 (17 Mar 2023 02:00) (15 - 25)  PCWP: --  SVR: 757 (17 Mar 2023 00:00) (757 - 1224)  SVRI: 1534 (17 Mar 2023 00:00) (1534 - 2448)  PVR: --  PVRI: --, ABG - ( 16 Mar 2023 21:31 )  pH, Arterial: 7.35  pH, Blood: x     /  pCO2: 44    /  pO2: 129   / HCO3: 24    / Base Excess: -1.5  /  SaO2: 100.0             Mode: AC/ CMV (Assist Control/ Continuous Mandatory Ventilation)  RR (machine): 16  TV (machine): 550  FiO2: 40  PEEP: 5  ITime: 1  MAP: 8  PIP: 15    Intake and output was reviewed and the fluid balance was calculated  Daily Height in cm: 180.34 (16 Mar 2023 03:52)    Daily   I&O's Summary    15 Mar 2023 07:01  -  16 Mar 2023 07:00  --------------------------------------------------------  IN: 0 mL / OUT: 0 mL / NET: 0 mL    16 Mar 2023 07:01  -  17 Mar 2023 02:18  --------------------------------------------------------  IN: 959.4 mL / OUT: 2290 mL / NET: -1330.6 mL        All lines and drain sites were assessed  [facility]      POCT Blood Glucose.: 122 mg/dL (16 Mar 2023 17:55)    No acute change in mental status  Auscultation of the chest reveals equal bs  Abdomen is soft  Extremities are warm and well perfused  Wounds appear clean and unremarkable  Antibiotics are periop    labs  CBC Full  -  ( 16 Mar 2023 21:31 )  WBC Count : 8.83 K/uL  RBC Count : 2.97 M/uL  Hemoglobin : 9.3 g/dL  Hematocrit : 27.1 %  Platelet Count - Automated : 134 K/uL  Mean Cell Volume : 91.2 fl  Mean Cell Hemoglobin : 31.3 pg  Mean Cell Hemoglobin Concentration : 34.3 gm/dL  Auto Neutrophil # : x  Auto Lymphocyte # : x  Auto Monocyte # : x  Auto Eosinophil # : x  Auto Basophil # : x  Auto Neutrophil % : x  Auto Lymphocyte % : x  Auto Monocyte % : x  Auto Eosinophil % : x  Auto Basophil % : x    03-16    141  |  108  |  19  ----------------------------<  170<H>  4.5   |  26  |  0.87    Ca    8.0<L>      16 Mar 2023 21:31  Phos  4.9     03-16  Mg     2.1     03-16    TPro  5.0<L>  /  Alb  3.2<L>  /  TBili  1.4<H>  /  DBili  x   /  AST  52<H>  /  ALT  20  /  AlkPhos  51  03-16    PT/INR - ( 16 Mar 2023 21:31 )   PT: 13.3 sec;   INR: 1.12          PTT - ( 16 Mar 2023 21:31 )  PTT:29.0 sec  The current medications were reviewed   MEDICATIONS  (STANDING):  aspirin  chewable 81 milliGRAM(s) Oral daily  atorvastatin 80 milliGRAM(s) Oral at bedtime  ceFAZolin   IVPB 2000 milliGRAM(s) IV Intermittent every 8 hours  chlorhexidine 4% Liquid 1 Application(s) Topical <User Schedule>  dextrose 50% Injectable 50 milliLiter(s) IV Push every 15 minutes  dextrose 50% Injectable 25 milliLiter(s) IV Push every 15 minutes  heparin   Injectable 5000 Unit(s) SubCutaneous every 8 hours  insulin regular Infusion 1 Unit(s)/Hr (1 mL/Hr) IV Continuous <Continuous>  ketorolac   Injectable 30 milliGRAM(s) IV Push once  norepinephrine Infusion 0.05 MICROgram(s)/kG/Min (7.47 mL/Hr) IV Continuous <Continuous>  pantoprazole    Tablet 40 milliGRAM(s) Oral before breakfast  polyethylene glycol 3350 17 Gram(s) Oral daily  senna 2 Tablet(s) Oral at bedtime  sodium chloride 0.9%. 1000 milliLiter(s) (10 mL/Hr) IV Continuous <Continuous>    MEDICATIONS  (PRN):  fentaNYL    Injectable 12.5 MICROGram(s) IV Push every 3 hours PRN Severe Pain (7 - 10)  oxyCODONE    IR 5 milliGRAM(s) Oral every 6 hours PRN Moderate Pain (4 - 6)  oxyCODONE    IR 10 milliGRAM(s) Oral every 6 hours PRN Severe Pain (7 - 10)       PROBLEM LIST/ ASSESSMENT:  HEALTH ISSUES - PROBLEM Dx:      ,   Patient is a 57y old  Male who presents for aortic root replacement (16 Mar 2023 14:55)     s/p Valve sparing aortic root repair; ascending and haylie arch replacement    My plan includes :    Continue AV pacing; check underlying rhythm periodically  Titrate pressor support to maintain MAP >70  Continue supplemental O2  Titrate Fio2 to maintain Sao2 >95  Serial ABGs  Strict blood sugar control  close hemodynamic, ventilatory and drain monitoring and management per post op routine    Monitor for arrhythmias and monitor parameters for organ perfusion  monitor neurologic status  Head of the bed should remain elevated to 45 deg .   chest PT and IS will be encouraged  monitor adequacy of oxygenation and ventilation and attempt to wean oxygen  Nutritional goals will be met using po eventually , ensure adequate caloric intake and montior the same  Stress ulcer and VTE prophylaxis will be achieved    Glycemic control is satisfactory  Electrolytes have been repleted as necessary and wound care has been carried out. Pain control has been achieved.   agressive physical therapy and early mobility and ambulation goals will be met   The family was updated about the course and plan  CRITICAL CARE TIME SPENT in evaluation and management, reassessments, review and interpretation of labs and x-rays, ventilator and hemodynamic management, formulating a plan and coordinating care: ___30___ MIN.  Time does not include procedural time.  CTICU ATTENDING     					    Juan Grace MD

## 2023-03-16 NOTE — BRIEF OPERATIVE NOTE - OPERATION/FINDINGS
EF normal   aortic root and ascending aneurysm  mild aortic insufficiency EF normal   aortic root and ascending aneurysm  mild aortic insufficiency    Intraop/postop CASI, no AI appreciated

## 2023-03-17 LAB
ALBUMIN SERPL ELPH-MCNC: 3 G/DL — LOW (ref 3.3–5)
ALBUMIN SERPL ELPH-MCNC: 3.1 G/DL — LOW (ref 3.3–5)
ALBUMIN SERPL ELPH-MCNC: 3.2 G/DL — LOW (ref 3.3–5)
ALP SERPL-CCNC: 46 U/L — SIGNIFICANT CHANGE UP (ref 40–120)
ALP SERPL-CCNC: 49 U/L — SIGNIFICANT CHANGE UP (ref 40–120)
ALP SERPL-CCNC: 51 U/L — SIGNIFICANT CHANGE UP (ref 40–120)
ALT FLD-CCNC: 16 U/L — SIGNIFICANT CHANGE UP (ref 10–45)
ALT FLD-CCNC: 19 U/L — SIGNIFICANT CHANGE UP (ref 10–45)
ALT FLD-CCNC: 20 U/L — SIGNIFICANT CHANGE UP (ref 10–45)
ANION GAP SERPL CALC-SCNC: 7 MMOL/L — SIGNIFICANT CHANGE UP (ref 5–17)
ANION GAP SERPL CALC-SCNC: 8 MMOL/L — SIGNIFICANT CHANGE UP (ref 5–17)
ANION GAP SERPL CALC-SCNC: 8 MMOL/L — SIGNIFICANT CHANGE UP (ref 5–17)
APTT BLD: 25.2 SEC — LOW (ref 27.5–35.5)
APTT BLD: 26 SEC — LOW (ref 27.5–35.5)
APTT BLD: 28.8 SEC — SIGNIFICANT CHANGE UP (ref 27.5–35.5)
AST SERPL-CCNC: 40 U/L — SIGNIFICANT CHANGE UP (ref 10–40)
AST SERPL-CCNC: 49 U/L — HIGH (ref 10–40)
AST SERPL-CCNC: 50 U/L — HIGH (ref 10–40)
BASE EXCESS BLDV CALC-SCNC: -0.1 MMOL/L — SIGNIFICANT CHANGE UP (ref -2–3)
BASE EXCESS BLDV CALC-SCNC: -1.5 MMOL/L — SIGNIFICANT CHANGE UP (ref -2–3)
BASE EXCESS BLDV CALC-SCNC: -1.9 MMOL/L — SIGNIFICANT CHANGE UP (ref -2–3)
BASE EXCESS BLDV CALC-SCNC: 0.8 MMOL/L — SIGNIFICANT CHANGE UP (ref -2–3)
BILIRUB SERPL-MCNC: 0.8 MG/DL — SIGNIFICANT CHANGE UP (ref 0.2–1.2)
BILIRUB SERPL-MCNC: 0.9 MG/DL — SIGNIFICANT CHANGE UP (ref 0.2–1.2)
BILIRUB SERPL-MCNC: 1 MG/DL — SIGNIFICANT CHANGE UP (ref 0.2–1.2)
BUN SERPL-MCNC: 17 MG/DL — SIGNIFICANT CHANGE UP (ref 7–23)
BUN SERPL-MCNC: 22 MG/DL — SIGNIFICANT CHANGE UP (ref 7–23)
BUN SERPL-MCNC: 23 MG/DL — SIGNIFICANT CHANGE UP (ref 7–23)
CALCIUM SERPL-MCNC: 7.7 MG/DL — LOW (ref 8.4–10.5)
CALCIUM SERPL-MCNC: 8.1 MG/DL — LOW (ref 8.4–10.5)
CALCIUM SERPL-MCNC: 8.1 MG/DL — LOW (ref 8.4–10.5)
CHLORIDE SERPL-SCNC: 104 MMOL/L — SIGNIFICANT CHANGE UP (ref 96–108)
CHLORIDE SERPL-SCNC: 107 MMOL/L — SIGNIFICANT CHANGE UP (ref 96–108)
CHLORIDE SERPL-SCNC: 108 MMOL/L — SIGNIFICANT CHANGE UP (ref 96–108)
CO2 BLDV-SCNC: 23.3 MMOL/L — SIGNIFICANT CHANGE UP (ref 22–26)
CO2 BLDV-SCNC: 25 MMOL/L — SIGNIFICANT CHANGE UP (ref 22–26)
CO2 BLDV-SCNC: 25.7 MMOL/L — SIGNIFICANT CHANGE UP (ref 22–26)
CO2 BLDV-SCNC: 28 MMOL/L — HIGH (ref 22–26)
CO2 SERPL-SCNC: 23 MMOL/L — SIGNIFICANT CHANGE UP (ref 22–31)
CO2 SERPL-SCNC: 25 MMOL/L — SIGNIFICANT CHANGE UP (ref 22–31)
CO2 SERPL-SCNC: 26 MMOL/L — SIGNIFICANT CHANGE UP (ref 22–31)
CREAT SERPL-MCNC: 0.82 MG/DL — SIGNIFICANT CHANGE UP (ref 0.5–1.3)
CREAT SERPL-MCNC: 0.95 MG/DL — SIGNIFICANT CHANGE UP (ref 0.5–1.3)
CREAT SERPL-MCNC: 1.03 MG/DL — SIGNIFICANT CHANGE UP (ref 0.5–1.3)
EGFR: 103 ML/MIN/1.73M2 — SIGNIFICANT CHANGE UP
EGFR: 85 ML/MIN/1.73M2 — SIGNIFICANT CHANGE UP
EGFR: 94 ML/MIN/1.73M2 — SIGNIFICANT CHANGE UP
GAS PNL BLDA: SIGNIFICANT CHANGE UP
GLUCOSE BLDC GLUCOMTR-MCNC: 115 MG/DL — HIGH (ref 70–99)
GLUCOSE BLDC GLUCOMTR-MCNC: 135 MG/DL — HIGH (ref 70–99)
GLUCOSE BLDC GLUCOMTR-MCNC: 150 MG/DL — HIGH (ref 70–99)
GLUCOSE BLDC GLUCOMTR-MCNC: 157 MG/DL — HIGH (ref 70–99)
GLUCOSE SERPL-MCNC: 163 MG/DL — HIGH (ref 70–99)
GLUCOSE SERPL-MCNC: 165 MG/DL — HIGH (ref 70–99)
GLUCOSE SERPL-MCNC: 175 MG/DL — HIGH (ref 70–99)
HCO3 BLDV-SCNC: 22 MMOL/L — SIGNIFICANT CHANGE UP (ref 22–29)
HCO3 BLDV-SCNC: 24 MMOL/L — SIGNIFICANT CHANGE UP (ref 22–29)
HCO3 BLDV-SCNC: 24 MMOL/L — SIGNIFICANT CHANGE UP (ref 22–29)
HCO3 BLDV-SCNC: 27 MMOL/L — SIGNIFICANT CHANGE UP (ref 22–29)
HCT VFR BLD CALC: 25 % — LOW (ref 39–50)
HCT VFR BLD CALC: 26.4 % — LOW (ref 39–50)
HCT VFR BLD CALC: 27.2 % — LOW (ref 39–50)
HGB BLD-MCNC: 8.6 G/DL — LOW (ref 13–17)
HGB BLD-MCNC: 9.1 G/DL — LOW (ref 13–17)
HGB BLD-MCNC: 9.5 G/DL — LOW (ref 13–17)
INR BLD: 1.15 — SIGNIFICANT CHANGE UP (ref 0.88–1.16)
LACTATE SERPL-SCNC: 1 MMOL/L — SIGNIFICANT CHANGE UP (ref 0.5–2)
LACTATE SERPL-SCNC: 2 MMOL/L — SIGNIFICANT CHANGE UP (ref 0.5–2)
LACTATE SERPL-SCNC: 2.2 MMOL/L — HIGH (ref 0.5–2)
MAGNESIUM SERPL-MCNC: 2 MG/DL — SIGNIFICANT CHANGE UP (ref 1.6–2.6)
MAGNESIUM SERPL-MCNC: 2.3 MG/DL — SIGNIFICANT CHANGE UP (ref 1.6–2.6)
MAGNESIUM SERPL-MCNC: 2.5 MG/DL — SIGNIFICANT CHANGE UP (ref 1.6–2.6)
MCHC RBC-ENTMCNC: 31.2 PG — SIGNIFICANT CHANGE UP (ref 27–34)
MCHC RBC-ENTMCNC: 31.4 PG — SIGNIFICANT CHANGE UP (ref 27–34)
MCHC RBC-ENTMCNC: 31.4 PG — SIGNIFICANT CHANGE UP (ref 27–34)
MCHC RBC-ENTMCNC: 34.4 GM/DL — SIGNIFICANT CHANGE UP (ref 32–36)
MCHC RBC-ENTMCNC: 34.5 GM/DL — SIGNIFICANT CHANGE UP (ref 32–36)
MCHC RBC-ENTMCNC: 34.9 GM/DL — SIGNIFICANT CHANGE UP (ref 32–36)
MCV RBC AUTO: 89.8 FL — SIGNIFICANT CHANGE UP (ref 80–100)
MCV RBC AUTO: 90.4 FL — SIGNIFICANT CHANGE UP (ref 80–100)
MCV RBC AUTO: 91.2 FL — SIGNIFICANT CHANGE UP (ref 80–100)
NRBC # BLD: 0 /100 WBCS — SIGNIFICANT CHANGE UP (ref 0–0)
PCO2 BLDV: 35 MMHG — LOW (ref 42–55)
PCO2 BLDV: 36 MMHG — LOW (ref 42–55)
PCO2 BLDV: 44 MMHG — SIGNIFICANT CHANGE UP (ref 42–55)
PCO2 BLDV: 46 MMHG — SIGNIFICANT CHANGE UP (ref 42–55)
PH BLDV: 7.35 — SIGNIFICANT CHANGE UP (ref 7.32–7.43)
PH BLDV: 7.37 — SIGNIFICANT CHANGE UP (ref 7.32–7.43)
PH BLDV: 7.41 — SIGNIFICANT CHANGE UP (ref 7.32–7.43)
PH BLDV: 7.43 — SIGNIFICANT CHANGE UP (ref 7.32–7.43)
PHOSPHATE SERPL-MCNC: 2.7 MG/DL — SIGNIFICANT CHANGE UP (ref 2.5–4.5)
PHOSPHATE SERPL-MCNC: 3.5 MG/DL — SIGNIFICANT CHANGE UP (ref 2.5–4.5)
PHOSPHATE SERPL-MCNC: 4.5 MG/DL — SIGNIFICANT CHANGE UP (ref 2.5–4.5)
PLATELET # BLD AUTO: 117 K/UL — LOW (ref 150–400)
PLATELET # BLD AUTO: 120 K/UL — LOW (ref 150–400)
PLATELET # BLD AUTO: 139 K/UL — LOW (ref 150–400)
PO2 BLDV: 38 MMHG — SIGNIFICANT CHANGE UP (ref 25–45)
PO2 BLDV: 38 MMHG — SIGNIFICANT CHANGE UP (ref 25–45)
PO2 BLDV: 43 MMHG — SIGNIFICANT CHANGE UP (ref 25–45)
PO2 BLDV: <33 MMHG — SIGNIFICANT CHANGE UP (ref 25–45)
POTASSIUM SERPL-MCNC: 3.9 MMOL/L — SIGNIFICANT CHANGE UP (ref 3.5–5.3)
POTASSIUM SERPL-MCNC: 4.1 MMOL/L — SIGNIFICANT CHANGE UP (ref 3.5–5.3)
POTASSIUM SERPL-MCNC: 4.3 MMOL/L — SIGNIFICANT CHANGE UP (ref 3.5–5.3)
POTASSIUM SERPL-SCNC: 3.9 MMOL/L — SIGNIFICANT CHANGE UP (ref 3.5–5.3)
POTASSIUM SERPL-SCNC: 4.1 MMOL/L — SIGNIFICANT CHANGE UP (ref 3.5–5.3)
POTASSIUM SERPL-SCNC: 4.3 MMOL/L — SIGNIFICANT CHANGE UP (ref 3.5–5.3)
PROT SERPL-MCNC: 4.9 G/DL — LOW (ref 6–8.3)
PROT SERPL-MCNC: 5.3 G/DL — LOW (ref 6–8.3)
PROT SERPL-MCNC: 5.4 G/DL — LOW (ref 6–8.3)
PROTHROM AB SERPL-ACNC: 13.7 SEC — HIGH (ref 10.5–13.4)
RBC # BLD: 2.74 M/UL — LOW (ref 4.2–5.8)
RBC # BLD: 2.92 M/UL — LOW (ref 4.2–5.8)
RBC # BLD: 3.03 M/UL — LOW (ref 4.2–5.8)
RBC # FLD: 12.9 % — SIGNIFICANT CHANGE UP (ref 10.3–14.5)
RBC # FLD: 13.1 % — SIGNIFICANT CHANGE UP (ref 10.3–14.5)
RBC # FLD: 13.2 % — SIGNIFICANT CHANGE UP (ref 10.3–14.5)
SAO2 % BLDV: 40.9 % — LOW (ref 67–88)
SAO2 % BLDV: 56.4 % — LOW (ref 67–88)
SAO2 % BLDV: 60.3 % — LOW (ref 67–88)
SAO2 % BLDV: 71.6 % — SIGNIFICANT CHANGE UP (ref 67–88)
SODIUM SERPL-SCNC: 138 MMOL/L — SIGNIFICANT CHANGE UP (ref 135–145)
SODIUM SERPL-SCNC: 138 MMOL/L — SIGNIFICANT CHANGE UP (ref 135–145)
SODIUM SERPL-SCNC: 140 MMOL/L — SIGNIFICANT CHANGE UP (ref 135–145)
WBC # BLD: 12.25 K/UL — HIGH (ref 3.8–10.5)
WBC # BLD: 12.7 K/UL — HIGH (ref 3.8–10.5)
WBC # BLD: 8.98 K/UL — SIGNIFICANT CHANGE UP (ref 3.8–10.5)
WBC # FLD AUTO: 12.25 K/UL — HIGH (ref 3.8–10.5)
WBC # FLD AUTO: 12.7 K/UL — HIGH (ref 3.8–10.5)
WBC # FLD AUTO: 8.98 K/UL — SIGNIFICANT CHANGE UP (ref 3.8–10.5)

## 2023-03-17 PROCEDURE — 71045 X-RAY EXAM CHEST 1 VIEW: CPT | Mod: 26

## 2023-03-17 PROCEDURE — 71045 X-RAY EXAM CHEST 1 VIEW: CPT | Mod: 26,77

## 2023-03-17 PROCEDURE — 99291 CRITICAL CARE FIRST HOUR: CPT

## 2023-03-17 PROCEDURE — 99292 CRITICAL CARE ADDL 30 MIN: CPT

## 2023-03-17 RX ORDER — GLUCAGON INJECTION, SOLUTION 0.5 MG/.1ML
1 INJECTION, SOLUTION SUBCUTANEOUS ONCE
Refills: 0 | Status: DISCONTINUED | OUTPATIENT
Start: 2023-03-17 | End: 2023-03-21

## 2023-03-17 RX ORDER — INSULIN LISPRO 100/ML
VIAL (ML) SUBCUTANEOUS
Refills: 0 | Status: DISCONTINUED | OUTPATIENT
Start: 2023-03-17 | End: 2023-03-21

## 2023-03-17 RX ORDER — SODIUM CHLORIDE 9 MG/ML
1000 INJECTION, SOLUTION INTRAVENOUS
Refills: 0 | Status: DISCONTINUED | OUTPATIENT
Start: 2023-03-17 | End: 2023-03-21

## 2023-03-17 RX ORDER — ACETAMINOPHEN 500 MG
1000 TABLET ORAL ONCE
Refills: 0 | Status: COMPLETED | OUTPATIENT
Start: 2023-03-17 | End: 2023-03-17

## 2023-03-17 RX ORDER — FUROSEMIDE 40 MG
20 TABLET ORAL ONCE
Refills: 0 | Status: COMPLETED | OUTPATIENT
Start: 2023-03-17 | End: 2023-03-17

## 2023-03-17 RX ORDER — ONDANSETRON 8 MG/1
4 TABLET, FILM COATED ORAL ONCE
Refills: 0 | Status: COMPLETED | OUTPATIENT
Start: 2023-03-17 | End: 2023-03-17

## 2023-03-17 RX ORDER — DEXTROSE 50 % IN WATER 50 %
25 SYRINGE (ML) INTRAVENOUS ONCE
Refills: 0 | Status: DISCONTINUED | OUTPATIENT
Start: 2023-03-17 | End: 2023-03-21

## 2023-03-17 RX ORDER — DEXTROSE 50 % IN WATER 50 %
15 SYRINGE (ML) INTRAVENOUS ONCE
Refills: 0 | Status: DISCONTINUED | OUTPATIENT
Start: 2023-03-17 | End: 2023-03-21

## 2023-03-17 RX ORDER — KETOROLAC TROMETHAMINE 30 MG/ML
30 SYRINGE (ML) INJECTION ONCE
Refills: 0 | Status: DISCONTINUED | OUTPATIENT
Start: 2023-03-17 | End: 2023-03-17

## 2023-03-17 RX ORDER — MAGNESIUM SULFATE 500 MG/ML
2 VIAL (ML) INJECTION ONCE
Refills: 0 | Status: COMPLETED | OUTPATIENT
Start: 2023-03-17 | End: 2023-03-17

## 2023-03-17 RX ORDER — ACETAMINOPHEN 500 MG
650 TABLET ORAL EVERY 6 HOURS
Refills: 0 | Status: DISCONTINUED | OUTPATIENT
Start: 2023-03-17 | End: 2023-03-21

## 2023-03-17 RX ORDER — DEXTROSE 50 % IN WATER 50 %
12.5 SYRINGE (ML) INTRAVENOUS ONCE
Refills: 0 | Status: DISCONTINUED | OUTPATIENT
Start: 2023-03-17 | End: 2023-03-21

## 2023-03-17 RX ORDER — ALBUMIN HUMAN 25 %
250 VIAL (ML) INTRAVENOUS ONCE
Refills: 0 | Status: COMPLETED | OUTPATIENT
Start: 2023-03-17 | End: 2023-03-17

## 2023-03-17 RX ORDER — KETOROLAC TROMETHAMINE 30 MG/ML
15 SYRINGE (ML) INJECTION ONCE
Refills: 0 | Status: DISCONTINUED | OUTPATIENT
Start: 2023-03-17 | End: 2023-03-17

## 2023-03-17 RX ORDER — POTASSIUM CHLORIDE 20 MEQ
20 PACKET (EA) ORAL ONCE
Refills: 0 | Status: COMPLETED | OUTPATIENT
Start: 2023-03-17 | End: 2023-03-17

## 2023-03-17 RX ORDER — ONDANSETRON 8 MG/1
4 TABLET, FILM COATED ORAL ONCE
Refills: 0 | Status: DISCONTINUED | OUTPATIENT
Start: 2023-03-17 | End: 2023-03-20

## 2023-03-17 RX ORDER — SODIUM CHLORIDE 9 MG/ML
3 INJECTION INTRAMUSCULAR; INTRAVENOUS; SUBCUTANEOUS EVERY 8 HOURS
Refills: 0 | Status: DISCONTINUED | OUTPATIENT
Start: 2023-03-17 | End: 2023-03-21

## 2023-03-17 RX ADMIN — Medication 15 MILLIGRAM(S): at 17:37

## 2023-03-17 RX ADMIN — ONDANSETRON 4 MILLIGRAM(S): 8 TABLET, FILM COATED ORAL at 05:30

## 2023-03-17 RX ADMIN — Medication 20 MILLIGRAM(S): at 19:52

## 2023-03-17 RX ADMIN — Medication 15 MILLIGRAM(S): at 23:30

## 2023-03-17 RX ADMIN — Medication 30 MILLIGRAM(S): at 02:30

## 2023-03-17 RX ADMIN — Medication 100 MILLIGRAM(S): at 19:58

## 2023-03-17 RX ADMIN — PANTOPRAZOLE SODIUM 40 MILLIGRAM(S): 20 TABLET, DELAYED RELEASE ORAL at 06:23

## 2023-03-17 RX ADMIN — HEPARIN SODIUM 5000 UNIT(S): 5000 INJECTION INTRAVENOUS; SUBCUTANEOUS at 13:50

## 2023-03-17 RX ADMIN — Medication 30 MILLIGRAM(S): at 10:15

## 2023-03-17 RX ADMIN — Medication 400 MILLIGRAM(S): at 05:30

## 2023-03-17 RX ADMIN — Medication 100 MILLIEQUIVALENT(S): at 10:58

## 2023-03-17 RX ADMIN — HEPARIN SODIUM 5000 UNIT(S): 5000 INJECTION INTRAVENOUS; SUBCUTANEOUS at 05:15

## 2023-03-17 RX ADMIN — Medication 15 MILLIGRAM(S): at 22:30

## 2023-03-17 RX ADMIN — Medication 25 GRAM(S): at 05:15

## 2023-03-17 RX ADMIN — Medication 30 MILLIGRAM(S): at 02:45

## 2023-03-17 RX ADMIN — ATORVASTATIN CALCIUM 80 MILLIGRAM(S): 80 TABLET, FILM COATED ORAL at 22:22

## 2023-03-17 RX ADMIN — Medication 400 MILLIGRAM(S): at 13:32

## 2023-03-17 RX ADMIN — Medication 2: at 07:13

## 2023-03-17 RX ADMIN — HEPARIN SODIUM 5000 UNIT(S): 5000 INJECTION INTRAVENOUS; SUBCUTANEOUS at 22:21

## 2023-03-17 RX ADMIN — Medication 20 MILLIGRAM(S): at 14:28

## 2023-03-17 RX ADMIN — Medication 1000 MILLIGRAM(S): at 05:45

## 2023-03-17 RX ADMIN — Medication 100 MILLIGRAM(S): at 05:15

## 2023-03-17 RX ADMIN — Medication 15 MILLIGRAM(S): at 19:52

## 2023-03-17 RX ADMIN — POLYETHYLENE GLYCOL 3350 17 GRAM(S): 17 POWDER, FOR SOLUTION ORAL at 11:37

## 2023-03-17 RX ADMIN — CHLORHEXIDINE GLUCONATE 1 APPLICATION(S): 213 SOLUTION TOPICAL at 06:24

## 2023-03-17 RX ADMIN — Medication 30 MILLIGRAM(S): at 10:06

## 2023-03-17 RX ADMIN — Medication 81 MILLIGRAM(S): at 11:37

## 2023-03-17 RX ADMIN — Medication 125 MILLILITER(S): at 13:33

## 2023-03-17 RX ADMIN — Medication 15 MILLIGRAM(S): at 20:10

## 2023-03-17 RX ADMIN — Medication 15 MILLIGRAM(S): at 17:55

## 2023-03-17 RX ADMIN — Medication 125 MILLILITER(S): at 10:40

## 2023-03-17 RX ADMIN — SODIUM CHLORIDE 3 MILLILITER(S): 9 INJECTION INTRAMUSCULAR; INTRAVENOUS; SUBCUTANEOUS at 22:29

## 2023-03-17 RX ADMIN — Medication 100 MILLIGRAM(S): at 11:37

## 2023-03-17 RX ADMIN — Medication 1000 MILLIGRAM(S): at 13:50

## 2023-03-17 NOTE — PHYSICAL THERAPY INITIAL EVALUATION ADULT - PERTINENT HX OF CURRENT PROBLEM, REHAB EVAL
Patient is a 57 year old male who presents for [facility] for pre-operative clearance prior to valve sparing aortic root replacement with possible AV replacement. Patient to be admitted to [facility] post procedure for procedure with Dr. Cerna.

## 2023-03-17 NOTE — PHYSICAL THERAPY INITIAL EVALUATION ADULT - GENERAL OBSERVATIONS, REHAB EVAL
SEBASTIAN Elis present throughout session. Patient received semi-supine in NAD with +ECG +central line +ayah x 3 +4L O2 NC +lori +TPM +belle +SCDx2

## 2023-03-17 NOTE — PHYSICAL THERAPY INITIAL EVALUATION ADULT - ADDITIONAL COMMENTS
Patient reports he lives in elevator apartment with ramp to enter. PTA patient was independent with all mobility and ADLs.

## 2023-03-17 NOTE — PHYSICAL THERAPY INITIAL EVALUATION ADULT - LEVEL OF INDEPENDENCE, REHAB EVAL
HPI:  60 yo mentally challenged female brought in from group home with report of wheezing, coughing, and change from baseline. Pt herself does not add to history due to cognitive challenges. PArents are at bedside and reporting that patient is improving since admission and they thought she would be discharged today. (02 Dec 2019 14:41)      PAST MEDICAL & SURGICAL HISTORY:  Osteoporosis  CP (cerebral palsy)  Scoliosis  Mentally challenged  Epilepsy      Antimicrobials      Immunological      Other  albuterol/ipratropium for Nebulization 3 milliLiter(s) Nebulizer every 6 hours  buDESOnide    Inhalation Suspension 0.5 milliGRAM(s) Inhalation two times a day  dextrose 40% Gel 15 Gram(s) Oral once PRN  dextrose 5%. 1000 milliLiter(s) IV Continuous <Continuous>  dextrose 50% Injectable 12.5 Gram(s) IV Push once  dextrose 50% Injectable 25 Gram(s) IV Push once  dextrose 50% Injectable 25 Gram(s) IV Push once  diltiazem    milliGRAM(s) Oral daily  diVALproex Sprinkle 375 milliGRAM(s) Oral daily  diVALproex Sprinkle 125 milliGRAM(s) Oral daily  enoxaparin Injectable 40 milliGRAM(s) SubCutaneous daily  furosemide    Tablet 20 milliGRAM(s) Oral daily  glucagon  Injectable 1 milliGRAM(s) IntraMuscular once PRN  glycerin Suppository - Adult 1 Suppository(s) Rectal daily  hydrocortisone sodium succinate Injectable 40 milliGRAM(s) IV Push daily  lactulose Syrup 10 Gram(s) Oral three times a day  latanoprost 0.005% Ophthalmic Solution 1 Drop(s) Both EYES at bedtime  LORazepam   Injectable 0.5 milliGRAM(s) IV Push every 4 hours PRN  montelukast 10 milliGRAM(s) Oral at bedtime  potassium chloride    Tablet ER 10 milliEquivalent(s) Oral daily  senna 2 Tablet(s) Oral at bedtime      Allergies    Colace (Unknown)  phenobarbital (Unknown)    Intolerances        SOCIAL HISTORY:  not oxic habits      FAMILY HISTORY:  noncontrib    ROS:    limited by cognitive status    Vital Signs Last 24 Hrs  T(C): 37.7 (05 Dec 2019 05:52), Max: 37.7 (05 Dec 2019 05:52)  T(F): 99.8 (05 Dec 2019 05:52), Max: 99.8 (05 Dec 2019 05:52)  HR: 100 (05 Dec 2019 08:02) (72 - 108)  BP: 128/70 (05 Dec 2019 05:52) (109/68 - 128/70)  BP(mean): --  RR: 17 (05 Dec 2019 05:52) (16 - 18)  SpO2: 93% (05 Dec 2019 10:45) (87% - 96%)    PE:  WDWN in no distress  HEENT:  NC, PERRL, sclerae anicteric, conjunctivae clear, EOMI.  Sinuses nontender, no nasal exudate.  No buccal or pharyngeal lesions, erythema or exudate  Neck:  Supple, no adenopathy  Lungs:  No accessory muscle use, bilaterally scattered ronchi  Cor:  RRR, S1, S2, no murmur appreciated  Abd:  Symmetric, normoactive BS.  Soft, nontender, no masses, guarding or rebound.  Liver and spleen not enlarged  Extrem:  No cyanosis or edema  Skin:  No rashes.  Neuro: grossly intact  Musc: moving all limbs freely, no focal deficits    LABS:                        13.3   10.98 )-----------( 238      ( 05 Dec 2019 08:35 )             40.6       WBC Count: 10.98 K/uL (12-05-19 @ 08:35)  WBC Count: 10.56 K/uL (12-04-19 @ 07:22)  WBC Count: 15.01 K/uL (12-03-19 @ 06:38)  WBC Count: 20.48 K/uL (12-02-19 @ 08:19)      12-05    147<H>  |  110<H>  |  9   ----------------------------<  118<H>  4.1   |  33<H>  |  0.42<L>    Ca    8.6      05 Dec 2019 08:35    TPro  6.4  /  Alb  2.7<L>  /  TBili  0.4  /  DBili  x   /  AST  23  /  ALT  22  /  AlkPhos  63  12-05      Creatinine, Serum: 0.42 mg/dL (12-05-19 @ 08:35)  Creatinine, Serum: 0.57 mg/dL (12-04-19 @ 07:22)  Creatinine, Serum: 0.53 mg/dL (12-03-19 @ 06:38)  Creatinine, Serum: 0.67 mg/dL (12-02-19 @ 08:19)      MICROBIOLOGY:    Parainfluenza 1 (RapRVP): Detected (12.02.19 @ 22:49)        RADIOLOGY & ADDITIONAL STUDIES:    --< from: CT Chest No Cont (12.02.19 @ 09:36) >    EXAM:  CT CHEST                            PROCEDURE DATE:  12/02/2019          INTERPRETATION:  CLINICAL INFORMATION: Difficulty breathing, fever    COMPARISON: None.    PROCEDURE:   CT of the Chest was performed without intravenous contrast.  Sagittal and coronal reformats were performed.      FINDINGS:    LUNGS AND AIRWAYS: Patent central airways.  Low lung volumes. Left   perihilar and lower lobe consolidation likely reflecting a combination of   pneumonia and atelectasis. Right perihilar groundglass infiltrate   consistent with pneumonia    PLEURA: Trace left pleural effusion    MEDIASTINUM AND TORRES: No lymphadenopathy. Lack of IV contrast limits   hilar evaluation.    VESSELS: Nonaneurysmal.    HEART: Heart size is normal. No pericardial effusion.    CHEST WALL AND LOWER NECK: Within normal limits.    VISUALIZED UPPER ABDOMEN: Bilateral nonobstructive nephrolithiasis.   Functional distention of the colon    BONES: Profound scoliosis. Nonspecific sclerotic focus involving a   midthoracic vertebral body.    IMPRESSION:     Bilateral pneumonia as described. Please image to resolution.  Additional findings as discussed independent

## 2023-03-17 NOTE — PROGRESS NOTE ADULT - SUBJECTIVE AND OBJECTIVE BOX
INTERVAL HPI/OVERNIGHT EVENTS:    POD#1 valve sparing Aortic root repair/ascending & hemiarch replacement   EF normal   Cardiologist: Dr. Sanjay Joiner    56yo Male w/FamHx aortic aneurysm (2 brothers), CAD, HTN, hiatal hernia and known aortic aneurysm  4.7cm that has recently expanded to 5.2cm presenting for intervention - asymptomatic     ECHO 1/25/23: normal EF, aortic root 5.2cm, ascending aorta 4.7cm, trileaflet aortic valve w/mild-mod AR.     MRA 1/30/23: thoracic aortic aneurysm measuring up to 5.1cm in aortic root, mod sized hiatal hernia with intrathoracic stomach.   Renal US 1/25/23: mild diffuse plaque in abdominal aorta, no renal artery stenosis.     Cath: nonobstructive CAD    to OR 3/16  intraop - 2 FFP, 1 Plt, 5 Cryo/FEIBA 500. 1.3 cell saver; 4 L Crystalloid  self terminating VT when coming off bypass reported - native rhythm sinus 30's - AV paced overnight   arrived to ICU on levophed/precedex  initial CI 2.6    extubated in short post-op period and now on nasal cannula  native rhythm with improvement - sinus in 50's occ junctional rhythm noted on tele assessment   titrated off levophed 5a today    PMHx includes but is not limited to:   H/O aortic aneurysm  HTN (hypertension)  Nonobstructive cardiomyopathy  History of hip surgery  H/O knee surgery    ICU Vital Signs Last 24 Hrs  T(C): 37.4 (17 Mar 2023 05:25), Max: 37.4 (17 Mar 2023 05:25)  T(F): 99.3 (17 Mar 2023 05:25), Max: 99.3 (17 Mar 2023 05:25)  HR: 63 (17 Mar 2023 09:00) (52 - 82) sinus   ABP: 106/58 (17 Mar 2023 09:00) (93/56 - 139/86)  ABP(mean): 74 (17 Mar 2023 09:00) (68 - 106)  RR: 19 (17 Mar 2023 09:00) (14 - 24)  SpO2: 93% (17 Mar 2023 09:00) (93% - 100%) 4L NC    Qtts:  None     I&O's Summary    16 Mar 2023 07:01  -  17 Mar 2023 07:00  --------------------------------------------------------  IN: 1415 mL / OUT: 2695 mL / NET: -1280 mL    17 Mar 2023 07:01  -  17 Mar 2023 09:57  --------------------------------------------------------  IN: 60 mL / OUT: 190 mL / NET: -130 mL    Physical Exam    Heart - sinus - no rub/gallop  Lungs - CTA anterior - no rhonchi/wheeze  Abd - (+)BS soft NTND - no rebound/guarding  Ext - warm to touch - no cyansosis/clubbing  Chest - op bandage in place  Neuro - alert/oriented and interactive - nonfocal   Skin - no rash     LABS:                        9.5    12.70 )-----------( 139      ( 17 Mar 2023 09:18 )             27.2     03-17    140  |  108  |  17  ----------------------------<  175<H>  4.3   |  25  |  0.82    Ca    7.7<L>      17 Mar 2023 02:19  Phos  4.5     03-17  Mg     2.0     03-17    TPro  4.9<L>  /  Alb  3.0<L>  /  TBili  1.0  /  DBili  x   /  AST  49<H>  /  ALT  20  /  AlkPhos  51  03-17    PT/INR - ( 17 Mar 2023 02:19 )   PT: 13.7 sec;   INR: 1.15     PTT - ( 17 Mar 2023 09:17 )  PTT:26.0 sec    ABG - ( 17 Mar 2023 09:17 )  pH, Arterial: 7.50  pH, Blood: x     /  pCO2: 29    /  pO2: 110   / HCO3: 23    / Base Excess: 0.4   /  SaO2: 98.3      RADIOLOGY & ADDITIONAL STUDIES: reviewed     patient with known aortic aneurysm presenting for elective repair - doing well with post-op junctional alternating with sinus rhythm.    1. CV  stable hemodynamics  tele - sinus socorro (baseline HR 50-60 per patient and wife) alternating with junctional;  slight inc in LA and drop in SV02 noted on recent labs  will A pace to facilitate CO  plan remove swan later today  ASA/statin   complete periop Abx prophylaxis   periodic recheck of native rhythm  serial labs    2. Pulm   titrate supplemental oxygen down as clinical scenario allows  monitor chest tube output   incentive spirometry/ambulation with staff assist  OOB in chair    maintain glycemic control - HgA1c 5.4    Bowel regimen   DVT and GI prophylaxis    d/w patient/wife present at bedside; staff and CTS    I have spent/provided stated minutes of critical care time to this patient: 90

## 2023-03-18 LAB
ANION GAP SERPL CALC-SCNC: 6 MMOL/L — SIGNIFICANT CHANGE UP (ref 5–17)
BUN SERPL-MCNC: 28 MG/DL — HIGH (ref 7–23)
CALCIUM SERPL-MCNC: 8.3 MG/DL — LOW (ref 8.4–10.5)
CHLORIDE SERPL-SCNC: 105 MMOL/L — SIGNIFICANT CHANGE UP (ref 96–108)
CO2 SERPL-SCNC: 27 MMOL/L — SIGNIFICANT CHANGE UP (ref 22–31)
CREAT SERPL-MCNC: 1.03 MG/DL — SIGNIFICANT CHANGE UP (ref 0.5–1.3)
EGFR: 85 ML/MIN/1.73M2 — SIGNIFICANT CHANGE UP
GLUCOSE BLDC GLUCOMTR-MCNC: 116 MG/DL — HIGH (ref 70–99)
GLUCOSE BLDC GLUCOMTR-MCNC: 125 MG/DL — HIGH (ref 70–99)
GLUCOSE BLDC GLUCOMTR-MCNC: 137 MG/DL — HIGH (ref 70–99)
GLUCOSE BLDC GLUCOMTR-MCNC: 89 MG/DL — SIGNIFICANT CHANGE UP (ref 70–99)
GLUCOSE SERPL-MCNC: 129 MG/DL — HIGH (ref 70–99)
HCT VFR BLD CALC: 26.2 % — LOW (ref 39–50)
HGB BLD-MCNC: 8.6 G/DL — LOW (ref 13–17)
MAGNESIUM SERPL-MCNC: 2.3 MG/DL — SIGNIFICANT CHANGE UP (ref 1.6–2.6)
MCHC RBC-ENTMCNC: 31.6 PG — SIGNIFICANT CHANGE UP (ref 27–34)
MCHC RBC-ENTMCNC: 32.8 GM/DL — SIGNIFICANT CHANGE UP (ref 32–36)
MCV RBC AUTO: 96.3 FL — SIGNIFICANT CHANGE UP (ref 80–100)
NRBC # BLD: 0 /100 WBCS — SIGNIFICANT CHANGE UP (ref 0–0)
PHOSPHATE SERPL-MCNC: 3.3 MG/DL — SIGNIFICANT CHANGE UP (ref 2.5–4.5)
PLATELET # BLD AUTO: 103 K/UL — LOW (ref 150–400)
POTASSIUM SERPL-MCNC: 4.4 MMOL/L — SIGNIFICANT CHANGE UP (ref 3.5–5.3)
POTASSIUM SERPL-SCNC: 4.4 MMOL/L — SIGNIFICANT CHANGE UP (ref 3.5–5.3)
RBC # BLD: 2.72 M/UL — LOW (ref 4.2–5.8)
RBC # FLD: 13.2 % — SIGNIFICANT CHANGE UP (ref 10.3–14.5)
SODIUM SERPL-SCNC: 138 MMOL/L — SIGNIFICANT CHANGE UP (ref 135–145)
WBC # BLD: 10.19 K/UL — SIGNIFICANT CHANGE UP (ref 3.8–10.5)
WBC # FLD AUTO: 10.19 K/UL — SIGNIFICANT CHANGE UP (ref 3.8–10.5)

## 2023-03-18 PROCEDURE — 71045 X-RAY EXAM CHEST 1 VIEW: CPT | Mod: 26

## 2023-03-18 RX ORDER — TRAMADOL HYDROCHLORIDE 50 MG/1
25 TABLET ORAL ONCE
Refills: 0 | Status: DISCONTINUED | OUTPATIENT
Start: 2023-03-18 | End: 2023-03-18

## 2023-03-18 RX ORDER — KETOROLAC TROMETHAMINE 30 MG/ML
15 SYRINGE (ML) INJECTION ONCE
Refills: 0 | Status: DISCONTINUED | OUTPATIENT
Start: 2023-03-18 | End: 2023-03-18

## 2023-03-18 RX ADMIN — TRAMADOL HYDROCHLORIDE 25 MILLIGRAM(S): 50 TABLET ORAL at 21:04

## 2023-03-18 RX ADMIN — Medication 81 MILLIGRAM(S): at 11:13

## 2023-03-18 RX ADMIN — POLYETHYLENE GLYCOL 3350 17 GRAM(S): 17 POWDER, FOR SOLUTION ORAL at 11:13

## 2023-03-18 RX ADMIN — Medication 15 MILLIGRAM(S): at 13:00

## 2023-03-18 RX ADMIN — Medication 100 MILLIGRAM(S): at 04:00

## 2023-03-18 RX ADMIN — OXYCODONE HYDROCHLORIDE 10 MILLIGRAM(S): 5 TABLET ORAL at 17:09

## 2023-03-18 RX ADMIN — SODIUM CHLORIDE 3 MILLILITER(S): 9 INJECTION INTRAMUSCULAR; INTRAVENOUS; SUBCUTANEOUS at 21:05

## 2023-03-18 RX ADMIN — OXYCODONE HYDROCHLORIDE 10 MILLIGRAM(S): 5 TABLET ORAL at 16:39

## 2023-03-18 RX ADMIN — SENNA PLUS 2 TABLET(S): 8.6 TABLET ORAL at 21:05

## 2023-03-18 RX ADMIN — Medication 15 MILLIGRAM(S): at 13:39

## 2023-03-18 RX ADMIN — HEPARIN SODIUM 5000 UNIT(S): 5000 INJECTION INTRAVENOUS; SUBCUTANEOUS at 13:15

## 2023-03-18 RX ADMIN — PANTOPRAZOLE SODIUM 40 MILLIGRAM(S): 20 TABLET, DELAYED RELEASE ORAL at 06:37

## 2023-03-18 RX ADMIN — HEPARIN SODIUM 5000 UNIT(S): 5000 INJECTION INTRAVENOUS; SUBCUTANEOUS at 06:37

## 2023-03-18 RX ADMIN — Medication 15 MILLIGRAM(S): at 05:35

## 2023-03-18 RX ADMIN — OXYCODONE HYDROCHLORIDE 5 MILLIGRAM(S): 5 TABLET ORAL at 10:08

## 2023-03-18 RX ADMIN — SODIUM CHLORIDE 3 MILLILITER(S): 9 INJECTION INTRAMUSCULAR; INTRAVENOUS; SUBCUTANEOUS at 06:20

## 2023-03-18 RX ADMIN — OXYCODONE HYDROCHLORIDE 5 MILLIGRAM(S): 5 TABLET ORAL at 09:04

## 2023-03-18 RX ADMIN — HEPARIN SODIUM 5000 UNIT(S): 5000 INJECTION INTRAVENOUS; SUBCUTANEOUS at 21:05

## 2023-03-18 RX ADMIN — TRAMADOL HYDROCHLORIDE 25 MILLIGRAM(S): 50 TABLET ORAL at 22:00

## 2023-03-18 RX ADMIN — Medication 15 MILLIGRAM(S): at 05:50

## 2023-03-18 RX ADMIN — SENNA PLUS 2 TABLET(S): 8.6 TABLET ORAL at 06:40

## 2023-03-18 RX ADMIN — CHLORHEXIDINE GLUCONATE 1 APPLICATION(S): 213 SOLUTION TOPICAL at 06:38

## 2023-03-18 RX ADMIN — ATORVASTATIN CALCIUM 80 MILLIGRAM(S): 80 TABLET, FILM COATED ORAL at 21:05

## 2023-03-18 NOTE — PROGRESS NOTE ADULT - SUBJECTIVE AND OBJECTIVE BOX
Patient discussed on morning rounds with Dr. Harrison     Operation / Date: 3/16/23 sternotomy with valve sparing aortic root replacement, ascending and hemiarch replacement     SUBJECTIVE ASSESSMENT:  57y Male states that he would really like to get up and walk today. No complaints at this time.         Vital Signs Last 24 Hrs  T(C): 36.1 (18 Mar 2023 13:59), Max: 37.9 (18 Mar 2023 10:07)  T(F): 96.9 (18 Mar 2023 13:59), Max: 100.3 (18 Mar 2023 10:07)  HR: 64 (18 Mar 2023 16:32) (62 - 76)  BP: 117/56 (18 Mar 2023 16:32) (106/58 - 142/63)  BP(mean): 80 (18 Mar 2023 16:32) (77 - 90)  RR: 15 (18 Mar 2023 16:32) (12 - 19)  SpO2: 96% (18 Mar 2023 16:32) (94% - 100%)    Parameters below as of 18 Mar 2023 16:32  Patient On (Oxygen Delivery Method): nasal cannula w/ humidification  O2 Flow (L/min): 4    I&O's Detail    17 Mar 2023 07:01  -  18 Mar 2023 07:00  --------------------------------------------------------  IN:    IV PiggyBack: 850 mL    Lactated Ringers Bolus: 30 mL    Oral Fluid: 2020 mL    sodium chloride 0.9%: 130 mL  Total IN: 3030 mL    OUT:    Drain (mL): 500 mL    Indwelling Catheter - Urethral (mL): 1320 mL  Total OUT: 1820 mL    Total NET: 1210 mL      18 Mar 2023 07:01  -  18 Mar 2023 17:39  --------------------------------------------------------  IN:  Total IN: 0 mL    OUT:    Indwelling Catheter - Urethral (mL): 225 mL  Total OUT: 225 mL    Total NET: -225 mL          CHEST TUBE: No. AI  JIAN DRAIN:  Yes  EPICARDIAL WIRES: Yes  TIE DOWNS: No.  PERALTA: No.    PHYSICAL EXAM:  GEN: NAD, looks comfortable  Psych: Mood appropriate  Neuro: A&Ox3.  No focal deficits.  Moving all extremities.   HEENT: No obvious abnormalities  CV: S1S2, regular, no murmurs appreciated.  No carotid bruits.  No JVD  Lungs: Clear B/L.  No wheezing, rales or rhonchi  ABD: Soft, non-tender, non-distended.  +Bowel sounds  EXT: Warm and well perfused.  No peripheral edema noted  Musculoskeletal: Moving all extremities with normal ROM, no joint swelling  PV: Pedal pulses palpable        LABS:                        8.6    10.19 )-----------( 103      ( 18 Mar 2023 05:30 )             26.2       PT/INR - ( 17 Mar 2023 02:19 )   PT: 13.7 sec;   INR: 1.15          PTT - ( 17 Mar 2023 17:50 )  PTT:28.8 sec    03-18    138  |  105  |  28<H>  ----------------------------<  129<H>  4.4   |  27  |  1.03    Ca    8.3<L>      18 Mar 2023 05:30  Phos  3.3     03-18  Mg     2.3     03-18    TPro  5.3<L>  /  Alb  3.1<L>  /  TBili  0.8  /  DBili  x   /  AST  40  /  ALT  16  /  AlkPhos  46  03-17          MEDICATIONS  (STANDING):  aspirin  chewable 81 milliGRAM(s) Oral daily  atorvastatin 80 milliGRAM(s) Oral at bedtime  chlorhexidine 4% Liquid 1 Application(s) Topical <User Schedule>  dextrose 5%. 1000 milliLiter(s) (50 mL/Hr) IV Continuous <Continuous>  dextrose 5%. 1000 milliLiter(s) (100 mL/Hr) IV Continuous <Continuous>  dextrose 50% Injectable 25 Gram(s) IV Push once  dextrose 50% Injectable 12.5 Gram(s) IV Push once  dextrose 50% Injectable 25 Gram(s) IV Push once  glucagon  Injectable 1 milliGRAM(s) IntraMuscular once  heparin   Injectable 5000 Unit(s) SubCutaneous every 8 hours  insulin lispro (ADMELOG) corrective regimen sliding scale   SubCutaneous Before meals and at bedtime  ondansetron Injectable 4 milliGRAM(s) IV Push once  pantoprazole    Tablet 40 milliGRAM(s) Oral before breakfast  polyethylene glycol 3350 17 Gram(s) Oral daily  senna 2 Tablet(s) Oral at bedtime  sodium chloride 0.9% lock flush 3 milliLiter(s) IV Push every 8 hours  sodium chloride 0.9%. 1000 milliLiter(s) (10 mL/Hr) IV Continuous <Continuous>    MEDICATIONS  (PRN):  acetaminophen     Tablet .. 650 milliGRAM(s) Oral every 6 hours PRN Temp greater or equal to 38C (100.4F), Mild Pain (1 - 3)  dextrose Oral Gel 15 Gram(s) Oral once PRN Blood Glucose LESS THAN 70 milliGRAM(s)/deciliter  oxyCODONE    IR 5 milliGRAM(s) Oral every 6 hours PRN Moderate Pain (4 - 6)  oxyCODONE    IR 10 milliGRAM(s) Oral every 6 hours PRN Severe Pain (7 - 10)        RADIOLOGY & ADDITIONAL TESTS:    < from: Xray Chest 1 View- PORTABLE-Routine (Xray Chest 1 View- PORTABLE-Routine in AM.) (03.18.23 @ 05:56) >    FINDINGS:    Single frontal view of the chest demonstrates mild bibasilar atelectasis,   unchanged. Line and tubes are unchanged. Mediastinal sternotomy wires.   The cardiomediastinal silhouette is enlarged. No acute osseous   abnormalities. Overlying EKG leads and wires are noted    IMPRESSION: No interval change.    --- End of Report ---    < end of copied text >

## 2023-03-18 NOTE — PROGRESS NOTE ADULT - ASSESSMENT
56yo M,  with +FHx aortic aneurysm (2 brothers) PMHx nonobstructive CAD, HTN, hiatal hernia presented to Dr. Cerna for evaluation of aortic root and ascending aortic aneurysm. on 3/16 he undewent a sternotomy with valve sparing aortic root replacement, ascending and hemiarch replacement. He arrived to the ICU intubated on levo. He was quickly weaned off levo and extubated. POD#1 he was in a junctional rhythm with drops in his cardiac output. Indexes improved with a pacing. In the afternoon he began diuresis and was transferred to the floor. Today patient is in normal sinus in the 60s with a good blood pressure.     Neuro: pain management   - Oxy PRN     CVS: POD#2 from sternotomy with valve sparing aortic root replacement, ascending and hemiarch replacement; Normotensive. NSR.   - Continue ASA, Statin for CAD   - hold BB.     Respiratory: Saturating well on 4LNC   - Wean off O2 sat > 92%  - IS and ambulation  - Chest PT.     GI: Last BM today   - GI PPX   - Bowel regimen with Senna and Miralax     : BUN/ Creatinine 28/1.03  passed TOV.   - monitor I/Os.     Endo: FS well controlled.   - ISS     ID: WBC 10  afebrile   - completed periop ABX   - continue to monitor fever curve     Heme: DVT PPX   - SQH     Dispo plan:     Keiry Pham PA-C

## 2023-03-19 LAB
ALBUMIN SERPL ELPH-MCNC: 3.3 G/DL — SIGNIFICANT CHANGE UP (ref 3.3–5)
ALP SERPL-CCNC: 53 U/L — SIGNIFICANT CHANGE UP (ref 40–120)
ALT FLD-CCNC: 14 U/L — SIGNIFICANT CHANGE UP (ref 10–45)
ANION GAP SERPL CALC-SCNC: 9 MMOL/L — SIGNIFICANT CHANGE UP (ref 5–17)
AST SERPL-CCNC: 24 U/L — SIGNIFICANT CHANGE UP (ref 10–40)
BILIRUB SERPL-MCNC: 0.9 MG/DL — SIGNIFICANT CHANGE UP (ref 0.2–1.2)
BUN SERPL-MCNC: 27 MG/DL — HIGH (ref 7–23)
CALCIUM SERPL-MCNC: 8.6 MG/DL — SIGNIFICANT CHANGE UP (ref 8.4–10.5)
CHLORIDE SERPL-SCNC: 99 MMOL/L — SIGNIFICANT CHANGE UP (ref 96–108)
CO2 SERPL-SCNC: 27 MMOL/L — SIGNIFICANT CHANGE UP (ref 22–31)
CREAT SERPL-MCNC: 1.01 MG/DL — SIGNIFICANT CHANGE UP (ref 0.5–1.3)
CRP SERPL-MCNC: 166 MG/L — HIGH (ref 0–4)
EGFR: 87 ML/MIN/1.73M2 — SIGNIFICANT CHANGE UP
ERYTHROCYTE [SEDIMENTATION RATE] IN BLOOD: 102 MM/HR — HIGH
GLUCOSE BLDC GLUCOMTR-MCNC: 120 MG/DL — HIGH (ref 70–99)
GLUCOSE BLDC GLUCOMTR-MCNC: 79 MG/DL — SIGNIFICANT CHANGE UP (ref 70–99)
GLUCOSE BLDC GLUCOMTR-MCNC: 84 MG/DL — SIGNIFICANT CHANGE UP (ref 70–99)
GLUCOSE BLDC GLUCOMTR-MCNC: 95 MG/DL — SIGNIFICANT CHANGE UP (ref 70–99)
GLUCOSE SERPL-MCNC: 194 MG/DL — HIGH (ref 70–99)
HCT VFR BLD CALC: 26.2 % — LOW (ref 39–50)
HGB BLD-MCNC: 8.6 G/DL — LOW (ref 13–17)
MAGNESIUM SERPL-MCNC: 2 MG/DL — SIGNIFICANT CHANGE UP (ref 1.6–2.6)
MCHC RBC-ENTMCNC: 31.4 PG — SIGNIFICANT CHANGE UP (ref 27–34)
MCHC RBC-ENTMCNC: 32.8 GM/DL — SIGNIFICANT CHANGE UP (ref 32–36)
MCV RBC AUTO: 95.6 FL — SIGNIFICANT CHANGE UP (ref 80–100)
NRBC # BLD: 0 /100 WBCS — SIGNIFICANT CHANGE UP (ref 0–0)
PHOSPHATE SERPL-MCNC: 2.3 MG/DL — LOW (ref 2.5–4.5)
PLATELET # BLD AUTO: 136 K/UL — LOW (ref 150–400)
POTASSIUM SERPL-MCNC: 4.8 MMOL/L — SIGNIFICANT CHANGE UP (ref 3.5–5.3)
POTASSIUM SERPL-SCNC: 4.8 MMOL/L — SIGNIFICANT CHANGE UP (ref 3.5–5.3)
PROT SERPL-MCNC: 6 G/DL — SIGNIFICANT CHANGE UP (ref 6–8.3)
RBC # BLD: 2.74 M/UL — LOW (ref 4.2–5.8)
RBC # FLD: 13.1 % — SIGNIFICANT CHANGE UP (ref 10.3–14.5)
SODIUM SERPL-SCNC: 135 MMOL/L — SIGNIFICANT CHANGE UP (ref 135–145)
URATE SERPL-MCNC: 5.4 MG/DL — SIGNIFICANT CHANGE UP (ref 3.4–8.8)
WBC # BLD: 11.3 K/UL — HIGH (ref 3.8–10.5)
WBC # FLD AUTO: 11.3 K/UL — HIGH (ref 3.8–10.5)

## 2023-03-19 PROCEDURE — 71045 X-RAY EXAM CHEST 1 VIEW: CPT | Mod: 26

## 2023-03-19 PROCEDURE — 73560 X-RAY EXAM OF KNEE 1 OR 2: CPT | Mod: 26,RT

## 2023-03-19 RX ORDER — FUROSEMIDE 40 MG
20 TABLET ORAL ONCE
Refills: 0 | Status: DISCONTINUED | OUTPATIENT
Start: 2023-03-19 | End: 2023-03-19

## 2023-03-19 RX ORDER — IBUPROFEN 200 MG
600 TABLET ORAL EVERY 8 HOURS
Refills: 0 | Status: COMPLETED | OUTPATIENT
Start: 2023-03-19 | End: 2023-03-20

## 2023-03-19 RX ADMIN — OXYCODONE HYDROCHLORIDE 10 MILLIGRAM(S): 5 TABLET ORAL at 12:50

## 2023-03-19 RX ADMIN — CHLORHEXIDINE GLUCONATE 1 APPLICATION(S): 213 SOLUTION TOPICAL at 06:04

## 2023-03-19 RX ADMIN — OXYCODONE HYDROCHLORIDE 10 MILLIGRAM(S): 5 TABLET ORAL at 18:58

## 2023-03-19 RX ADMIN — OXYCODONE HYDROCHLORIDE 10 MILLIGRAM(S): 5 TABLET ORAL at 05:46

## 2023-03-19 RX ADMIN — Medication 650 MILLIGRAM(S): at 10:47

## 2023-03-19 RX ADMIN — OXYCODONE HYDROCHLORIDE 5 MILLIGRAM(S): 5 TABLET ORAL at 21:15

## 2023-03-19 RX ADMIN — Medication 650 MILLIGRAM(S): at 11:45

## 2023-03-19 RX ADMIN — HEPARIN SODIUM 5000 UNIT(S): 5000 INJECTION INTRAVENOUS; SUBCUTANEOUS at 05:55

## 2023-03-19 RX ADMIN — SODIUM CHLORIDE 3 MILLILITER(S): 9 INJECTION INTRAMUSCULAR; INTRAVENOUS; SUBCUTANEOUS at 05:56

## 2023-03-19 RX ADMIN — OXYCODONE HYDROCHLORIDE 10 MILLIGRAM(S): 5 TABLET ORAL at 11:51

## 2023-03-19 RX ADMIN — Medication 81 MILLIGRAM(S): at 11:00

## 2023-03-19 RX ADMIN — Medication 600 MILLIGRAM(S): at 16:08

## 2023-03-19 RX ADMIN — HEPARIN SODIUM 5000 UNIT(S): 5000 INJECTION INTRAVENOUS; SUBCUTANEOUS at 21:17

## 2023-03-19 RX ADMIN — OXYCODONE HYDROCHLORIDE 10 MILLIGRAM(S): 5 TABLET ORAL at 17:58

## 2023-03-19 RX ADMIN — Medication 650 MILLIGRAM(S): at 22:00

## 2023-03-19 RX ADMIN — OXYCODONE HYDROCHLORIDE 5 MILLIGRAM(S): 5 TABLET ORAL at 22:00

## 2023-03-19 RX ADMIN — SODIUM CHLORIDE 3 MILLILITER(S): 9 INJECTION INTRAMUSCULAR; INTRAVENOUS; SUBCUTANEOUS at 13:33

## 2023-03-19 RX ADMIN — SODIUM CHLORIDE 3 MILLILITER(S): 9 INJECTION INTRAMUSCULAR; INTRAVENOUS; SUBCUTANEOUS at 22:28

## 2023-03-19 RX ADMIN — Medication 650 MILLIGRAM(S): at 23:00

## 2023-03-19 RX ADMIN — OXYCODONE HYDROCHLORIDE 10 MILLIGRAM(S): 5 TABLET ORAL at 06:46

## 2023-03-19 RX ADMIN — SENNA PLUS 2 TABLET(S): 8.6 TABLET ORAL at 21:17

## 2023-03-19 RX ADMIN — Medication 600 MILLIGRAM(S): at 17:08

## 2023-03-19 RX ADMIN — ATORVASTATIN CALCIUM 80 MILLIGRAM(S): 80 TABLET, FILM COATED ORAL at 21:16

## 2023-03-19 RX ADMIN — HEPARIN SODIUM 5000 UNIT(S): 5000 INJECTION INTRAVENOUS; SUBCUTANEOUS at 13:41

## 2023-03-19 RX ADMIN — PANTOPRAZOLE SODIUM 40 MILLIGRAM(S): 20 TABLET, DELAYED RELEASE ORAL at 08:28

## 2023-03-19 NOTE — PROGRESS NOTE ADULT - ASSESSMENT
58yo M,  with +FHx aortic aneurysm (2 brothers) PMHx nonobstructive CAD, HTN, hiatal hernia presented to Dr. Cerna for evaluation of aortic root and ascending aortic aneurysm. on 3/16 he undewent a sternotomy with valve sparing aortic root replacement, ascending and hemiarch replacement. He arrived to the ICU intubated on levo. He was quickly weaned off levo and extubated. POD#1 he was in a junctional rhythm with drops in his cardiac output. Indexes improved with a pacing. In the afternoon he began diuresis and was transferred to the floor. POD#2 normal sinus in the 60s with a good blood pressure. POD# Patients right knee swollen and red. Ortho consulted. Concern for early gout although uric acid is normal.     Neuro: pain management   - Oxy PRN   - Tylenol and Ibuprofen for knee pain.     CVS: POD#3 from sternotomy with valve sparing aortic root replacement, ascending and hemiarch replacement; Normotensive. NSR.   - Continue ASA, Statin for CAD   - hold BB for bradycardia.     Respiratory: Saturating well on 4LNC   - Wean off O2 sat > 92%  - IS and ambulation  - Chest PT.     GI: Last BM today   - GI PPX   - Bowel regimen with Senna and Miralax     : BUN/ Creatinine 28/1.03  passed TOV.   - monitor I/Os.   - held off on Lasix for concern for gout.     Endo: FS well controlled.   - ISS     ID: WBC 10  afebrile   - completed periop ABX   - continue to monitor fever curve     Heme: DVT PPX   - SQH     Dispo plan: home when off of O2.     Keiry Pham PA-C

## 2023-03-19 NOTE — CONSULT NOTE ADULT - SUBJECTIVE AND OBJECTIVE BOX
Orthopaedic Surgery Consult Note    Attending Physician: Anthony  Consult requested by: CT Surgery    CC: R knee pain/swelling    HPI:  58yo M,  with +FHx aortic aneurysm (2 brothers) PMHx nonobstructive CAD, HTN, hiatal hernia presented to Dr. Cerna for evaluation of aortic root and ascending aortic aneurysm. Known TAA since 4.7cm that has recently expanded to 5.2cm. Denies chest pain, SOB, LUO, palpitations, dizziness, LOC, N/V/D, fever/chills/sick contact, diaphoresis, orthopnea/PND, and leg swelling. TTE 1/25/23: normal EF, aortic root 5.2cm, ascending aorta 4.7cm, trileaflet aortic valve with mild-mod AR. MRA 1/30/23: thoracic aortic aneurysm measuring up to 5.1cm in aortic root, mod sized hiatal hernia with intrathoracic stomach. Renal US 1/25/23: mild diffuse plaque in abdominal aorta, no renal artery stenosis. Patient presents for pre-op [facility] prior to valve sparing aortic root replacement with possible AV replacement. Patient to be admitted to [facility] post procedure for procedure with Dr. Cerna.     ORTHO: Pt is s/p AOV replacement and aortic root repair with CT surgery POD4, recently started on diuretics complaining of 1day history of atraumatic R knee pain and swelling.    Allergies    No Known Allergies    Intolerances      PAST MEDICAL & SURGICAL HISTORY:  H/O aortic aneurysm      HTN (hypertension)      Nonobstructive cardiomyopathy      History of hip surgery      H/O knee surgery          Meds:  acetaminophen     Tablet .. 650 milliGRAM(s) Oral every 6 hours PRN  aspirin  chewable 81 milliGRAM(s) Oral daily  atorvastatin 80 milliGRAM(s) Oral at bedtime  dextrose 5%. 1000 milliLiter(s) IV Continuous <Continuous>  dextrose 5%. 1000 milliLiter(s) IV Continuous <Continuous>  dextrose 50% Injectable 25 Gram(s) IV Push once  dextrose 50% Injectable 12.5 Gram(s) IV Push once  dextrose 50% Injectable 25 Gram(s) IV Push once  dextrose Oral Gel 15 Gram(s) Oral once PRN  furosemide   Injectable 20 milliGRAM(s) IV Push once  glucagon  Injectable 1 milliGRAM(s) IntraMuscular once  heparin   Injectable 5000 Unit(s) SubCutaneous every 8 hours  insulin lispro (ADMELOG) corrective regimen sliding scale   SubCutaneous Before meals and at bedtime  ondansetron Injectable 4 milliGRAM(s) IV Push once  oxyCODONE    IR 5 milliGRAM(s) Oral every 6 hours PRN  oxyCODONE    IR 10 milliGRAM(s) Oral every 6 hours PRN  pantoprazole    Tablet 40 milliGRAM(s) Oral before breakfast  polyethylene glycol 3350 17 Gram(s) Oral daily  senna 2 Tablet(s) Oral at bedtime  sodium chloride 0.9% lock flush 3 milliLiter(s) IV Push every 8 hours  sodium chloride 0.9%. 1000 milliLiter(s) IV Continuous <Continuous>      Family History:  Denies family history of bleeding disorders    Social History:   Pt is a nonsmoker  Social EtOH use     Review of Systems:  All review of systems are negative except for those mentioned in HPI.    Physical Exam:  General: Pt Alert and oriented, NAD   knee swollen, TTP, warm?, redness?  ROM __, painful arc of motion?  Pulses: 2+ dp, pt pulses, wwp, cap refill <3 seconds  Sensation: SILT sural/saph/sp/dp/ tibial distributions  Motor: EHL/FHL/TA/GS     Vital Signs Last 24 Hrs  T(C): 36.7 (19 Mar 2023 09:00), Max: 36.7 (19 Mar 2023 09:00)  T(F): 98 (19 Mar 2023 09:00), Max: 98 (19 Mar 2023 09:00)  HR: 66 (19 Mar 2023 08:56) (64 - 70)  BP: 114/63 (19 Mar 2023 08:56) (100/58 - 142/63)  BP(mean): 81 (19 Mar 2023 08:56) (74 - 90)  RR: 18 (19 Mar 2023 08:56) (15 - 18)  SpO2: 94% (19 Mar 2023 08:56) (94% - 96%)    Parameters below as of 19 Mar 2023 08:56  Patient On (Oxygen Delivery Method): nasal cannula w/ humidification  O2 Flow (L/min): 4        Labs:                        8.6    11.30 )-----------( 136      ( 19 Mar 2023 09:24 )             26.2     03-19    135  |  99  |  x   ----------------------------<  194<H>  4.8   |  27  |  1.01    Ca    8.6      19 Mar 2023 09:24  Phos  3.3     03-18  Mg     2.0     03-19    TPro  5.3<L>  /  Alb  3.1<L>  /  TBili  0.8  /  DBili  x   /  AST  40  /  ALT  16  /  AlkPhos  46  03-17    PTT - ( 17 Mar 2023 17:50 )  PTT:28.8 sec    Imaging:   Xray __ knee:     A/P: 57yMale w/ __ knee pain/swelling, ortho consulted to r/o gout vs septic knee    - __cc fluid aspirated yielding __ cell count, __ PMNs, __ crystals  - f/u aspirate cultures  - Discussed with Attending, Dr. Mo Freeman, PGY-2  Ortho Pager [phone]   Orthopaedic Surgery Consult Note    Attending Physician: Anthony  Consult requested by: CT Surgery    CC: R knee pain/swelling    HPI:  56yo M,  with +FHx aortic aneurysm (2 brothers) PMHx nonobstructive CAD, HTN, hiatal hernia presented to Dr. Cerna for evaluation of aortic root and ascending aortic aneurysm. Known TAA since 4.7cm that has recently expanded to 5.2cm. Denies chest pain, SOB, LUO, palpitations, dizziness, LOC, N/V/D, fever/chills/sick contact, diaphoresis, orthopnea/PND, and leg swelling. TTE 1/25/23: normal EF, aortic root 5.2cm, ascending aorta 4.7cm, trileaflet aortic valve with mild-mod AR. MRA 1/30/23: thoracic aortic aneurysm measuring up to 5.1cm in aortic root, mod sized hiatal hernia with intrathoracic stomach. Renal US 1/25/23: mild diffuse plaque in abdominal aorta, no renal artery stenosis. Patient presents for pre-op [facility] prior to valve sparing aortic root replacement with possible AV replacement. Patient to be admitted to [facility] post procedure for procedure with Dr. Cerna.     ORTHO: Pt is hx of arthroscopic debridement/meniscal surgery many years ago (no issues) s/p AOV replacement and aortic root repair with CT surgery POD4, recently started on diuretics complaining of 1day history of atraumatic R knee pain and swelling.    Allergies    No Known Allergies    Intolerances      PAST MEDICAL & SURGICAL HISTORY:  H/O aortic aneurysm      HTN (hypertension)      Nonobstructive cardiomyopathy      History of hip surgery      H/O knee surgery          Meds:  acetaminophen     Tablet .. 650 milliGRAM(s) Oral every 6 hours PRN  aspirin  chewable 81 milliGRAM(s) Oral daily  atorvastatin 80 milliGRAM(s) Oral at bedtime  dextrose 5%. 1000 milliLiter(s) IV Continuous <Continuous>  dextrose 5%. 1000 milliLiter(s) IV Continuous <Continuous>  dextrose 50% Injectable 25 Gram(s) IV Push once  dextrose 50% Injectable 12.5 Gram(s) IV Push once  dextrose 50% Injectable 25 Gram(s) IV Push once  dextrose Oral Gel 15 Gram(s) Oral once PRN  furosemide   Injectable 20 milliGRAM(s) IV Push once  glucagon  Injectable 1 milliGRAM(s) IntraMuscular once  heparin   Injectable 5000 Unit(s) SubCutaneous every 8 hours  insulin lispro (ADMELOG) corrective regimen sliding scale   SubCutaneous Before meals and at bedtime  ondansetron Injectable 4 milliGRAM(s) IV Push once  oxyCODONE    IR 5 milliGRAM(s) Oral every 6 hours PRN  oxyCODONE    IR 10 milliGRAM(s) Oral every 6 hours PRN  pantoprazole    Tablet 40 milliGRAM(s) Oral before breakfast  polyethylene glycol 3350 17 Gram(s) Oral daily  senna 2 Tablet(s) Oral at bedtime  sodium chloride 0.9% lock flush 3 milliLiter(s) IV Push every 8 hours  sodium chloride 0.9%. 1000 milliLiter(s) IV Continuous <Continuous>      Family History:  Denies family history of bleeding disorders    Social History:   Pt is a nonsmoker  Social EtOH use     Review of Systems:  All review of systems are negative except for those mentioned in HPI.    Physical Exam:  General: Pt Alert and oriented, NAD  R knee swollen, generalized TTP, warmer compared to contralateral leg  ROM 5-110 active, 0-125 passive, smooth arc of motion  no instability to laxity testing.  Pulses: 2+ dp, pt pulses, wwp, cap refill <3 seconds  Sensation: SILT sural/saph/sp/dp/ tibial distributions  Motor: EHL/FHL/TA/GS 5/5    Vital Signs Last 24 Hrs  T(C): 36.7 (19 Mar 2023 09:00), Max: 36.7 (19 Mar 2023 09:00)  T(F): 98 (19 Mar 2023 09:00), Max: 98 (19 Mar 2023 09:00)  HR: 66 (19 Mar 2023 08:56) (64 - 70)  BP: 114/63 (19 Mar 2023 08:56) (100/58 - 142/63)  BP(mean): 81 (19 Mar 2023 08:56) (74 - 90)  RR: 18 (19 Mar 2023 08:56) (15 - 18)  SpO2: 94% (19 Mar 2023 08:56) (94% - 96%)    Parameters below as of 19 Mar 2023 08:56  Patient On (Oxygen Delivery Method): nasal cannula w/ humidification  O2 Flow (L/min): 4        Labs:                        8.6    11.30 )-----------( 136      ( 19 Mar 2023 09:24 )             26.2     03-19    135  |  99  |  x   ----------------------------<  194<H>  4.8   |  27  |  1.01    Ca    8.6      19 Mar 2023 09:24  Phos  3.3     03-18  Mg     2.0     03-19    TPro  5.3<L>  /  Alb  3.1<L>  /  TBili  0.8  /  DBili  x   /  AST  40  /  ALT  16  /  AlkPhos  46  03-17    PTT - ( 17 Mar 2023 17:50 )  PTT:28.8 sec    Imaging:   Xray R knee: mild joint effusion, underlying degenerative arthritic changes of knee    A/P: 57yMale w/ atraumatic R knee pain/swelling in setting of recent CT surgery and diuretic use, ortho consulted to r/o gout vs septic knee  - stable  - elevated inflammatory markers but not specific in setting of recent surgery  - low suscipicion of septic knee at this time  - recommend NSAIDs for possible gout vs OA flare  - ortho will continue to follow to monitor exam  - WBAT  - PT/OT for mobilization  - Discussed with Attending, Dr. Anthony Freeman, PGY-2  Ortho Pager [phone]

## 2023-03-19 NOTE — PROGRESS NOTE ADULT - SUBJECTIVE AND OBJECTIVE BOX
Patient discussed on morning rounds with Dr. Harrison     Operation / Date: 3/16/23 sternotomy with valve sparing aortic root replacement, ascending and hemiarch replacement     SUBJECTIVE ASSESSMENT:  57y Male reports that he woke up with pain and swelling in the Right knee. States that he has a history a debridement in the right knee but no history of gout. Does not report any injury overnight.         Vital Signs Last 24 Hrs  T(C): 36.3 (19 Mar 2023 13:20), Max: 36.7 (19 Mar 2023 09:00)  T(F): 97.3 (19 Mar 2023 13:20), Max: 98 (19 Mar 2023 09:00)  HR: 58 (19 Mar 2023 13:20) (58 - 68)  BP: 91/56 (19 Mar 2023 13:20) (91/56 - 117/65)  BP(mean): 69 (19 Mar 2023 13:20) (69 - 83)  RR: 18 (19 Mar 2023 13:20) (16 - 18)  SpO2: 98% (19 Mar 2023 13:20) (94% - 98%)    Parameters below as of 19 Mar 2023 13:20  Patient On (Oxygen Delivery Method): nasal cannula w/ humidification  O2 Flow (L/min): 3    I&O's Detail    18 Mar 2023 07:01  -  19 Mar 2023 07:00  --------------------------------------------------------  IN:  Total IN: 0 mL    OUT:    Drain (mL): 150 mL    Indwelling Catheter - Urethral (mL): 225 mL    Voided (mL): 600 mL  Total OUT: 975 mL    Total NET: -975 mL          CHEST TUBE:  No  JIAN DRAIN:  Yes  EPICARDIAL WIRES: Yes  TIE DOWNS: No.  PERALTA: No.    PHYSICAL EXAM:    GEN: NAD, looks comfortable  Psych: Mood appropriate  Neuro: A&Ox3.  No focal deficits.  Moving all extremities.   HEENT: No obvious abnormalities  CV: S1S2, regular, no murmurs appreciated.  No carotid bruits.  No JVD  Lungs: Clear B/L.  No wheezing, rales or rhonchi  ABD: Soft, non-tender, non-distended.  +Bowel sounds  EXT: Warm and well perfused.  No peripheral edema noted  Musculoskeletal: RIght knee with eythema and increased warmth. Unable to fully straighten leg. Left leg normal.   PV: Pedal pulses palpable    LABS:                        8.6    11.30 )-----------( 136      ( 19 Mar 2023 09:24 )             26.2         PTT - ( 17 Mar 2023 17:50 )  PTT:28.8 sec    03-19    135  |  99  |  27<H>  ----------------------------<  194<H>  4.8   |  27  |  1.01    Ca    8.6      19 Mar 2023 09:24  Phos  2.3     03-19  Mg     2.0     03-19    TPro  6.0  /  Alb  3.3  /  TBili  0.9  /  DBili  x   /  AST  24  /  ALT  14  /  AlkPhos  53  03-19          MEDICATIONS  (STANDING):  aspirin  chewable 81 milliGRAM(s) Oral daily  atorvastatin 80 milliGRAM(s) Oral at bedtime  dextrose 5%. 1000 milliLiter(s) (50 mL/Hr) IV Continuous <Continuous>  dextrose 5%. 1000 milliLiter(s) (100 mL/Hr) IV Continuous <Continuous>  dextrose 50% Injectable 25 Gram(s) IV Push once  dextrose 50% Injectable 12.5 Gram(s) IV Push once  dextrose 50% Injectable 25 Gram(s) IV Push once  furosemide   Injectable 20 milliGRAM(s) IV Push once  glucagon  Injectable 1 milliGRAM(s) IntraMuscular once  heparin   Injectable 5000 Unit(s) SubCutaneous every 8 hours  ibuprofen  Tablet. 600 milliGRAM(s) Oral every 8 hours  insulin lispro (ADMELOG) corrective regimen sliding scale   SubCutaneous Before meals and at bedtime  ondansetron Injectable 4 milliGRAM(s) IV Push once  pantoprazole    Tablet 40 milliGRAM(s) Oral before breakfast  polyethylene glycol 3350 17 Gram(s) Oral daily  senna 2 Tablet(s) Oral at bedtime  sodium chloride 0.9% lock flush 3 milliLiter(s) IV Push every 8 hours  sodium chloride 0.9%. 1000 milliLiter(s) (10 mL/Hr) IV Continuous <Continuous>    MEDICATIONS  (PRN):  acetaminophen     Tablet .. 650 milliGRAM(s) Oral every 6 hours PRN Temp greater or equal to 38C (100.4F), Mild Pain (1 - 3)  dextrose Oral Gel 15 Gram(s) Oral once PRN Blood Glucose LESS THAN 70 milliGRAM(s)/deciliter  oxyCODONE    IR 5 milliGRAM(s) Oral every 6 hours PRN Moderate Pain (4 - 6)  oxyCODONE    IR 10 milliGRAM(s) Oral every 6 hours PRN Severe Pain (7 - 10)        RADIOLOGY & ADDITIONAL TESTS:  < from: Xray Chest 1 View- PORTABLE-Routine (Xray Chest 1 View- PORTABLE-Routine in AM.) (03.19.23 @ 05:40) >  Single frontal view of the chest demonstrates mild bibasilar atelectasis.   The cardiomediastinal silhouette is enlarged. Mediastinal sternotomy   wires. Mediastinal chest tubes. Status post right-sided central venous   catheter removal.. No acute osseous abnormalities. Overlying EKG leads   and wires are noted    IMPRESSION: Status post right-sided central venous catheter removal. No   large pneumothorax    --- End of Report ---    < end of copied text >

## 2023-03-20 LAB
ALBUMIN SERPL ELPH-MCNC: 3 G/DL — LOW (ref 3.3–5)
ALP SERPL-CCNC: 48 U/L — SIGNIFICANT CHANGE UP (ref 40–120)
ALT FLD-CCNC: 17 U/L — SIGNIFICANT CHANGE UP (ref 10–45)
ANION GAP SERPL CALC-SCNC: 9 MMOL/L — SIGNIFICANT CHANGE UP (ref 5–17)
ANION GAP SERPL CALC-SCNC: 9 MMOL/L — SIGNIFICANT CHANGE UP (ref 5–17)
APTT BLD: 28.2 SEC — SIGNIFICANT CHANGE UP (ref 27.5–35.5)
AST SERPL-CCNC: 26 U/L — SIGNIFICANT CHANGE UP (ref 10–40)
BILIRUB SERPL-MCNC: 0.8 MG/DL — SIGNIFICANT CHANGE UP (ref 0.2–1.2)
BUN SERPL-MCNC: 24 MG/DL — HIGH (ref 7–23)
BUN SERPL-MCNC: 25 MG/DL — HIGH (ref 7–23)
CALCIUM SERPL-MCNC: 8.2 MG/DL — LOW (ref 8.4–10.5)
CALCIUM SERPL-MCNC: 8.6 MG/DL — SIGNIFICANT CHANGE UP (ref 8.4–10.5)
CHLORIDE SERPL-SCNC: 100 MMOL/L — SIGNIFICANT CHANGE UP (ref 96–108)
CHLORIDE SERPL-SCNC: 100 MMOL/L — SIGNIFICANT CHANGE UP (ref 96–108)
CO2 SERPL-SCNC: 27 MMOL/L — SIGNIFICANT CHANGE UP (ref 22–31)
CO2 SERPL-SCNC: 27 MMOL/L — SIGNIFICANT CHANGE UP (ref 22–31)
CREAT SERPL-MCNC: 0.93 MG/DL — SIGNIFICANT CHANGE UP (ref 0.5–1.3)
CREAT SERPL-MCNC: 0.99 MG/DL — SIGNIFICANT CHANGE UP (ref 0.5–1.3)
EGFR: 89 ML/MIN/1.73M2 — SIGNIFICANT CHANGE UP
EGFR: 96 ML/MIN/1.73M2 — SIGNIFICANT CHANGE UP
GLUCOSE BLDC GLUCOMTR-MCNC: 102 MG/DL — HIGH (ref 70–99)
GLUCOSE BLDC GLUCOMTR-MCNC: 77 MG/DL — SIGNIFICANT CHANGE UP (ref 70–99)
GLUCOSE BLDC GLUCOMTR-MCNC: 83 MG/DL — SIGNIFICANT CHANGE UP (ref 70–99)
GLUCOSE BLDC GLUCOMTR-MCNC: 93 MG/DL — SIGNIFICANT CHANGE UP (ref 70–99)
GLUCOSE SERPL-MCNC: 111 MG/DL — HIGH (ref 70–99)
GLUCOSE SERPL-MCNC: 94 MG/DL — SIGNIFICANT CHANGE UP (ref 70–99)
HCT VFR BLD CALC: 24.1 % — LOW (ref 39–50)
HGB BLD-MCNC: 8 G/DL — LOW (ref 13–17)
INR BLD: 1.27 — HIGH (ref 0.88–1.16)
MAGNESIUM SERPL-MCNC: 2.1 MG/DL — SIGNIFICANT CHANGE UP (ref 1.6–2.6)
MCHC RBC-ENTMCNC: 31.4 PG — SIGNIFICANT CHANGE UP (ref 27–34)
MCHC RBC-ENTMCNC: 33.2 GM/DL — SIGNIFICANT CHANGE UP (ref 32–36)
MCV RBC AUTO: 94.5 FL — SIGNIFICANT CHANGE UP (ref 80–100)
NRBC # BLD: 0 /100 WBCS — SIGNIFICANT CHANGE UP (ref 0–0)
PHOSPHATE SERPL-MCNC: 3.7 MG/DL — SIGNIFICANT CHANGE UP (ref 2.5–4.5)
PLATELET # BLD AUTO: 149 K/UL — LOW (ref 150–400)
POTASSIUM SERPL-MCNC: 4 MMOL/L — SIGNIFICANT CHANGE UP (ref 3.5–5.3)
POTASSIUM SERPL-MCNC: 4 MMOL/L — SIGNIFICANT CHANGE UP (ref 3.5–5.3)
POTASSIUM SERPL-SCNC: 4 MMOL/L — SIGNIFICANT CHANGE UP (ref 3.5–5.3)
POTASSIUM SERPL-SCNC: 4 MMOL/L — SIGNIFICANT CHANGE UP (ref 3.5–5.3)
PROT SERPL-MCNC: 5.4 G/DL — LOW (ref 6–8.3)
PROTHROM AB SERPL-ACNC: 15.1 SEC — HIGH (ref 10.5–13.4)
RBC # BLD: 2.55 M/UL — LOW (ref 4.2–5.8)
RBC # FLD: 12.8 % — SIGNIFICANT CHANGE UP (ref 10.3–14.5)
SODIUM SERPL-SCNC: 136 MMOL/L — SIGNIFICANT CHANGE UP (ref 135–145)
SODIUM SERPL-SCNC: 136 MMOL/L — SIGNIFICANT CHANGE UP (ref 135–145)
WBC # BLD: 8.55 K/UL — SIGNIFICANT CHANGE UP (ref 3.8–10.5)
WBC # FLD AUTO: 8.55 K/UL — SIGNIFICANT CHANGE UP (ref 3.8–10.5)

## 2023-03-20 PROCEDURE — 71045 X-RAY EXAM CHEST 1 VIEW: CPT | Mod: 26

## 2023-03-20 PROCEDURE — 99221 1ST HOSP IP/OBS SF/LOW 40: CPT

## 2023-03-20 RX ORDER — IBUPROFEN 200 MG
600 TABLET ORAL EVERY 8 HOURS
Refills: 0 | Status: DISCONTINUED | OUTPATIENT
Start: 2023-03-20 | End: 2023-03-21

## 2023-03-20 RX ORDER — COLCHICINE 0.6 MG
0.6 TABLET ORAL DAILY
Refills: 0 | Status: DISCONTINUED | OUTPATIENT
Start: 2023-03-20 | End: 2023-03-21

## 2023-03-20 RX ORDER — METOPROLOL TARTRATE 50 MG
12.5 TABLET ORAL EVERY 12 HOURS
Refills: 0 | Status: DISCONTINUED | OUTPATIENT
Start: 2023-03-20 | End: 2023-03-21

## 2023-03-20 RX ADMIN — Medication 600 MILLIGRAM(S): at 01:00

## 2023-03-20 RX ADMIN — Medication 600 MILLIGRAM(S): at 17:18

## 2023-03-20 RX ADMIN — Medication 650 MILLIGRAM(S): at 08:00

## 2023-03-20 RX ADMIN — OXYCODONE HYDROCHLORIDE 5 MILLIGRAM(S): 5 TABLET ORAL at 06:00

## 2023-03-20 RX ADMIN — Medication 600 MILLIGRAM(S): at 16:29

## 2023-03-20 RX ADMIN — SODIUM CHLORIDE 3 MILLILITER(S): 9 INJECTION INTRAMUSCULAR; INTRAVENOUS; SUBCUTANEOUS at 05:39

## 2023-03-20 RX ADMIN — Medication 0.6 MILLIGRAM(S): at 14:26

## 2023-03-20 RX ADMIN — Medication 600 MILLIGRAM(S): at 09:00

## 2023-03-20 RX ADMIN — SODIUM CHLORIDE 3 MILLILITER(S): 9 INJECTION INTRAMUSCULAR; INTRAVENOUS; SUBCUTANEOUS at 14:09

## 2023-03-20 RX ADMIN — POLYETHYLENE GLYCOL 3350 17 GRAM(S): 17 POWDER, FOR SOLUTION ORAL at 11:07

## 2023-03-20 RX ADMIN — HEPARIN SODIUM 5000 UNIT(S): 5000 INJECTION INTRAVENOUS; SUBCUTANEOUS at 14:26

## 2023-03-20 RX ADMIN — PANTOPRAZOLE SODIUM 40 MILLIGRAM(S): 20 TABLET, DELAYED RELEASE ORAL at 07:00

## 2023-03-20 RX ADMIN — HEPARIN SODIUM 5000 UNIT(S): 5000 INJECTION INTRAVENOUS; SUBCUTANEOUS at 05:55

## 2023-03-20 RX ADMIN — ATORVASTATIN CALCIUM 80 MILLIGRAM(S): 80 TABLET, FILM COATED ORAL at 22:24

## 2023-03-20 RX ADMIN — Medication 600 MILLIGRAM(S): at 08:00

## 2023-03-20 RX ADMIN — OXYCODONE HYDROCHLORIDE 10 MILLIGRAM(S): 5 TABLET ORAL at 13:24

## 2023-03-20 RX ADMIN — OXYCODONE HYDROCHLORIDE 5 MILLIGRAM(S): 5 TABLET ORAL at 07:00

## 2023-03-20 RX ADMIN — Medication 650 MILLIGRAM(S): at 07:00

## 2023-03-20 RX ADMIN — Medication 12.5 MILLIGRAM(S): at 14:26

## 2023-03-20 RX ADMIN — OXYCODONE HYDROCHLORIDE 10 MILLIGRAM(S): 5 TABLET ORAL at 14:11

## 2023-03-20 RX ADMIN — SODIUM CHLORIDE 3 MILLILITER(S): 9 INJECTION INTRAMUSCULAR; INTRAVENOUS; SUBCUTANEOUS at 22:19

## 2023-03-20 RX ADMIN — OXYCODONE HYDROCHLORIDE 10 MILLIGRAM(S): 5 TABLET ORAL at 23:25

## 2023-03-20 RX ADMIN — Medication 81 MILLIGRAM(S): at 11:07

## 2023-03-20 RX ADMIN — HEPARIN SODIUM 5000 UNIT(S): 5000 INJECTION INTRAVENOUS; SUBCUTANEOUS at 22:23

## 2023-03-20 RX ADMIN — SENNA PLUS 2 TABLET(S): 8.6 TABLET ORAL at 22:22

## 2023-03-20 RX ADMIN — Medication 600 MILLIGRAM(S): at 00:00

## 2023-03-20 NOTE — CHART NOTE - NSCHARTNOTEFT_GEN_A_CORE
As per Dr. Cerna epicardial pacing wires removed at bedside. Ventricular and atrial epicardial wire pulled without resistance. Patient tolerated procedure well and remained hemodynamically stable. Plan for patient to stay in bed for 1 hour with q15 vital checks. Patient and RN aware of plan.

## 2023-03-20 NOTE — CONSULT NOTE ADULT - SUBJECTIVE AND OBJECTIVE BOX
HPI:    58 yo M,  with +FHx aortic aneurysm (2 brothers) PMHx nonobstructive CAD, HTN, hiatal hernia presented to Dr. Cerna for evaluation of aortic root and ascending aortic aneurysm. Known TAA since 4.7cm that has recently expanded to 5.2cm. Denies chest pain, SOB, LUO, palpitations, dizziness, LOC, N/V/D, fever/chills/sick contact, diaphoresis, orthopnea/PND, and leg swelling. Patient presents for pre-op [facility] prior to valve sparing aortic root replacement with possible AV replacement. Patient admitted to [facility] post procedure for procedure with Dr. Cerna. EP consulted for NSVT on telemetry.     Telemetry reviewed, pt had episodes of NSVT, last episode this AM. Pt denies palpitations. Endorses feeling his heart racing during the time of NSVT. Denies dizziness. Pt endorses having sinus bradycardia at baseline 40-50bpm. On telemetry today pt is 60-70 bpm.    PAST MEDICAL & SURGICAL HISTORY:  H/O aortic aneurysm  HTN (hypertension)  Nonobstructive cardiomyopathy  History of hip surgery  H/O knee surgery    FHx: thoracic aortic aneurysm (Sibling)    Social History: no smoking, no drugs, no alcohol    Inpatient Medications:   acetaminophen     Tablet .. 650 milliGRAM(s) Oral every 6 hours PRN  aspirin  chewable 81 milliGRAM(s) Oral daily  atorvastatin 80 milliGRAM(s) Oral at bedtime  colchicine 0.6 milliGRAM(s) Oral daily  heparin   Injectable 5000 Unit(s) SubCutaneous every 8 hours  ibuprofen  Tablet. 600 milliGRAM(s) Oral every 8 hours PRN  insulin lispro (ADMELOG) corrective regimen sliding scale   SubCutaneous Before meals and at bedtime  metoprolol tartrate 12.5 milliGRAM(s) Oral every 12 hours  oxyCODONE    IR 10 milliGRAM(s) Oral every 6 hours PRN  oxyCODONE    IR 5 milliGRAM(s) Oral every 6 hours PRN  pantoprazole    Tablet 40 milliGRAM(s) Oral before breakfast  polyethylene glycol 3350 17 Gram(s) Oral daily  senna 2 Tablet(s) Oral at bedtime  sodium chloride 0.9% lock flush 3 milliLiter(s) IV Push every 8 hours  sodium chloride 0.9%. 1000 milliLiter(s) IV Continuous <Continuous>    Allergies: No Known Allergies    ROS:   CONSTITUTIONAL: No fever, weight loss, fatigue  EYES: Pt denies  RESPIRATORY: No cough, wheezing, chills or hemoptysis;+ Shortness of Breath  CARDIOVASCULAR: see HPI  GASTROINTESTINAL: Pt denies  NEUROLOGICAL: Pt denies  SKIN: Pt denies   PSYCHIATRIC: Pt denies  HEME/LYMPH: Pt denies    PHYSICAL:  T(C): 36.1 (03-20-23 @ 13:09), Max: 36.8 (03-20-23 @ 08:55)  HR: 70 (03-20-23 @ 14:10) (56 - 70)  BP: 125/62 (03-20-23 @ 14:10) (100/61 - 125/62)  RR: 18 (03-20-23 @ 12:30) (18 - 20)  SpO2: 96% (03-20-23 @ 14:10) (61% - 96%)    Appearance: No acute distress, well developed  Eyes: normal appearing conjunctiva, pupils and eyelids  Cardiovascular: Normal S1 S2, No JVD, No murmurs, No edema  Respiratory: Lungs clear to auscultation bilaterally.  No wheeze, rhonchi, rales noted  Gastrointestinal:  Soft, NT/ND 	  Neurologic:  No deficit noted  Psych: A&Ox3, normal mood/affect  Skin: sternal wound is CDI, no rash noted, normal color and pigmentation.        LABS:                        8.0    8.55  )-------( 149      ( 20 Mar 2023 05:30 )             24.1     136  |  100  |  24<H>  ------------------------<  94  4.0   |  27  |  0.93    Ca    8.6      20 Mar 2023 12:11  Phos  3.7     03-20  Mg     2.1     03-20    TPro  5.4<L>  /  Alb  3.0<L>  /  TBili  0.8  /  DBili  x   /  AST  26  /  ALT  17  /  AlkPhos  48  03-20    PT/INR - ( 20 Mar 2023 05:30 )   PT: 15.1 sec;   INR: 1.27     PTT - ( 20 Mar 2023 05:30 )  PTT:28.2 sec    EKG:   3/15: Sinus bradycardia @44 bpm    Telemetry: Sinus bradycardia to NSR 50-70s bpm; 3/20/23 11:58AM NSR before having a 13 beat run of NSVT.    ECHO: none    Prior EP procedures: none    Cardiac Catheterization (03.15.23 @ 12:39) >  R. dominant system, minor irregularities with no obstructive lesions     Exercise MIBI Stress Test (Low Dose/High Dose) (06.03.22 @ 15:55) >  Impression: Myocardial perfusion imaging is normal.  Overall left ventricular systolic function is normal without regional wall motion abnormalities. The ECG portion is normal.    TTE 1/25/23: normal EF, aortic root 5.2cm, ascending aorta 4.7cm, trileaflet aortic valve with mild-mod AR    MRA 1/30/23: thoracic aortic aneurysm measuring up to 5.1cm in aortic root, mod sized hiatal hernia with intrathoracic stomach    Renal US 1/25/23: mild diffuse plaque in abdominal aorta, no renal artery stenosis.     Assessment & Plan:  58 yo M nonobstructive CAD, HTN, hiatal hernia, bilateral knee replacements, now POD #4 sternotomy with valve sparing aortic root replacement, ascending and hemiarch replacement on 3/16/23. EF NL. EP consulted for episodes of NSVT on tele. Team requesting recs on BB    - OK to start on low dose BB  - Rec to send pt out on [facility], if having more episodes

## 2023-03-20 NOTE — CHART NOTE - NSCHARTNOTEFT_GEN_A_CORE
As per Dr. Cerna, ayah chest tube x2 removed at bedside without incident. Patient tolerated procedure well. Follow up CXR stable, no pneumothorax.

## 2023-03-20 NOTE — PROGRESS NOTE ADULT - ASSESSMENT
Patient is a 58 y/o male with PMH of nonobstructive CAD, HTN, hiatal hernia & ascending aortic aneurysm. On 3/16 he underwent a valve sparing aortic root replacement, ascending and hemiarch replacement with Dr. Cerna without complication. He arrived to the ICU intubated and on levo. He was quickly weaned off levo and extubated. POD#1 he was in a junctional rhythm with drops in his cardiac output. Indexes improved with a pacing. In the afternoon he began diuresis and was transferred to the floor. On POD#2 normal sinus in the 60s with a good blood pressure and was transferred to the floor. On POD #3 he c/o right knee swelling and erythema & ortho was consulted. Today, POD 4, he continues to complain of right knee pain and swelling despite the addition of NSAIDs. He is otherwise stable, his pacing wires were removed.     Plan:    Neurovascular:   -No delirium.   -Pain regimen, PRN's: Tylenol, Oxy 5 & 10 mg    Musculoskeletal: Hx bilateral knee replacements  - Right knee swelling/erythema  - Appreciate orhto recs  - Minimally improvement with Ibuprofen 600mg x 3 doses  - Adding colchicine        Cardiovascular: Ascending aortic aneurysm   -POD 4: Valve sparing aortic root replacement, ascending and hemiarch replacement  - Continue ASA 81  - Continue Lipitor 80mg   - BB held 2/2 bradycardia   -Hemodynamically stable. HR controlled.  -Continue to monitor HR, BP, telemetry      Respiratory: Stable  -Oxygenating well on RA 95%  -Encourage coughing and use of IS 10x / hr while awake.  -CXR: clear     GI: Stable  -PPX: Protonix  -PO Diet: DASH/TLC  -Bowel regimen: Miralax/Senna    Renal / : Stable  -Monitor renal function.  -Monitor I/O's.  -BUN/Cr: 25 & 0.99    Endocrine: No hx of DM or thyroid   -A1c: 5.4  -TSH: 1.4    Hematologic: Stable  -H/H: 8 & 24.1  -Coagulation Panel: Pt 15.1 aptt 28.2 INR 1.27    ID: Stable  -Temperature: 98.3  -WBC: 8.55  -Observe for SIRS/Sepsis Syndrome.    Prophylaxis:  -DVT prophylaxis with 5000 SubQ Heparin q8h  -Continue with SCD's    Disposition:  -Discharge home when medically appropriate, tomorrow    Patient is a 56 y/o male with PMH of nonobstructive CAD, HTN, hiatal hernia & ascending aortic aneurysm. On 3/16 he underwent a valve sparing aortic root replacement, ascending and hemiarch replacement with Dr. Cerna without complication. He arrived to the ICU intubated and on levo. He was quickly weaned off levo and extubated. POD#1 he was in a junctional rhythm with drops in his cardiac output. Indexes improved with a pacing. In the afternoon he began diuresis and was transferred to the floor. On POD#2 normal sinus in the 60s with a good blood pressure and was transferred to the floor. On POD #3 he c/o right knee swelling and erythema & ortho was consulted. Today, POD 4, he continues to complain of right knee pain and swelling despite the addition of NSAIDs. He is otherwise stable, his pacing wires were removed.     Plan:    Neurovascular:   -No delirium.   -Pain regimen, PRN's: Tylenol, Oxy 5 & 10 mg    Musculoskeletal: Right knee OA vs gout flare  - Right knee swelling/erythema  - Appreciate orhto recs  - Minimally improvement with Ibuprofen 600mg x 3 doses  - Adding colchicine        Cardiovascular: Ascending aortic aneurysm   -POD 4: Valve sparing aortic root replacement, ascending and hemiarch replacement  - Continue ASA 81  - Continue Lipitor 80mg   - BB held 2/2 bradycardia   -Hemodynamically stable. HR controlled.  -Continue to monitor HR, BP, telemetry      Respiratory: Stable  -Oxygenating well on RA 95%  -Encourage coughing and use of IS 10x / hr while awake.  -CXR: clear     GI: Stable  -PPX: Protonix  -PO Diet: DASH/TLC  -Bowel regimen: Miralax/Senna    Renal / : Stable  -Monitor renal function.  -Monitor I/O's.  -BUN/Cr: 25 & 0.99    Endocrine: No hx of DM or thyroid   -A1c: 5.4  -TSH: 1.4    Hematologic: Stable  -H/H: 8 & 24.1  -Coagulation Panel: Pt 15.1 aptt 28.2 INR 1.27    ID: Stable  -Temperature: 98.3  -WBC: 8.55  -Observe for SIRS/Sepsis Syndrome.    Prophylaxis:  -DVT prophylaxis with 5000 SubQ Heparin q8h  -Continue with SCD's    Disposition:  -Discharge home when medically appropriate, tomorrow

## 2023-03-20 NOTE — DIETITIAN INITIAL EVALUATION ADULT - OTHER CALCULATIONS
5'11''  pounds +-10%; %[id number]  current body wt used for energy calculations as pt falls within % IBW  adjust for age, s/p OR

## 2023-03-20 NOTE — DIETITIAN INITIAL EVALUATION ADULT - OTHER INFO
56yo M, PMHx nonobstructive CAD, HTN, hiatal hernia presented to Dr. Cerna for evaluation of aortic root and ascending aortic aneurysm. On 3/16 undewent a sternotomy with valve sparing aortic root replacement, ascending and hemiarch replacement. He arrived to the ICU intubated on levo. He was quickly weaned off levo and extubated. POD#1 he was in a junctional rhythm with drops in his cardiac output. Indexes improved with a pacing. In the afternoon he began diuresis and was transferred to the floor. POD#2 normal sinus in the 60s with a good blood pressure. Now with right knee swollen and red. Ortho consulted. Concern for early gout although uric acid is normal.     Pt seen this AM on 9LA. Diet order: DASH TLC. Reports decreased PO post op d/t ongoing nausea, ordered for Protonix and reports somewhat of an improvement. Ate better today: oatmeal, fruit, yogurt. Wife has also been bringing in smoothies. PTA good PO, reports healthy diet (however does eat red meat from time to time). NKFA. No issues chewing/swallowing. Reports +BM with use of meds; ordered for senna and Miralax. BM+3/18 per flow sheets.  pounds admit wt 175 pound suggesting slight 5 pound wt difference vs gain. +Pain noted, ongoing r/o GOUT vs septic knee. Lipids WDL. Alessandro 20, No edema or pressure ulcers-SX site noted.   Please see below for nutritions recommendations.

## 2023-03-20 NOTE — PROGRESS NOTE ADULT - SUBJECTIVE AND OBJECTIVE BOX
Patient discussed on morning rounds with Dr. Cerna    Operation / Date: 3/16/23 Valve sparing aortic root replacement, ascending and hemiarch replacement     SUBJECTIVE ASSESSMENT:  57y Male assessed this AM, OOB to chair. C/o right knee swelling and pain. Denies cp/sob/n/v/fever/chills. LBM yesterday. Tolerating PO.         Vital Signs Last 24 Hrs  T(C): 36.8 (20 Mar 2023 08:55), Max: 36.8 (20 Mar 2023 08:55)  T(F): 98.3 (20 Mar 2023 08:55), Max: 98.3 (20 Mar 2023 08:55)  HR: 66 (20 Mar 2023 09:00) (56 - 70)  BP: 100/61 (20 Mar 2023 09:00) (91/56 - 116/56)  BP(mean): 72 (20 Mar 2023 09:00) (69 - 80)  RR: 18 (20 Mar 2023 09:00) (18 - 20)  SpO2: 95% (20 Mar 2023 09:00) (95% - 98%)    Parameters below as of 20 Mar 2023 09:00  Patient On (Oxygen Delivery Method): room air      I&O's Detail    19 Mar 2023 07:01  -  20 Mar 2023 07:00  --------------------------------------------------------  IN:    Oral Fluid: 540 mL  Total IN: 540 mL    OUT:    Drain (mL): 135 mL    Voided (mL): 500 mL  Total OUT: 635 mL    Total NET: -95 mL      20 Mar 2023 07:01  -  20 Mar 2023 11:23  --------------------------------------------------------  IN:    Oral Fluid: 60 mL  Total IN: 60 mL    OUT:  Total OUT: 0 mL    Total NET: 60 mL          CHEST TUBE:  No.   JIAN DRAIN:  Yes.  EPICARDIAL WIRES: No.  TIE DOWNS: Yes.  PERALTA: No.    PHYSICAL EXAM:  Appearance: No acute distress.  Neurologic: AAOx3, no AMS or focal deficits.  Responds appropriately to verbal and physical stimuli; exhibits purposeful movement in all extremities.  HEENT:   MMM, PERRLA, EOMI	b/l  Neck: Supple   Cardiovascular: RRR, S1 S2. No m/r/g.  Respiratory: No acute respiratory distress. CTA b/l, no w/r/r.   Gastrointestinal:  Soft, non-tender, non-distended, + BS.	  Skin: No rashes. No ecchymoses. No cyanosis.  Extremities: Exhibits normal range of motion, no clubbing, cyanosis or edema.  Vascular: Peripheral pulses palpable 2+ bilaterally.   Incision Sites: MSI c/d/i, no click. CT sites c/d/i.     LABS:                        8.0    8.55  )-----------( 149      ( 20 Mar 2023 05:30 )             24.1       COUMADIN:  No     PT/INR - ( 20 Mar 2023 05:30 )   PT: 15.1 sec;   INR: 1.27          PTT - ( 20 Mar 2023 05:30 )  PTT:28.2 sec    03-20    136  |  100  |  25<H>  ----------------------------<  111<H>  4.0   |  27  |  0.99    Ca    8.2<L>      20 Mar 2023 05:30  Phos  3.7     03-20  Mg     2.1     03-20    TPro  5.4<L>  /  Alb  3.0<L>  /  TBili  0.8  /  DBili  x   /  AST  26  /  ALT  17  /  AlkPhos  48  03-20          MEDICATIONS  (STANDING):  aspirin  chewable 81 milliGRAM(s) Oral daily  atorvastatin 80 milliGRAM(s) Oral at bedtime  dextrose 5%. 1000 milliLiter(s) (50 mL/Hr) IV Continuous <Continuous>  dextrose 5%. 1000 milliLiter(s) (100 mL/Hr) IV Continuous <Continuous>  dextrose 50% Injectable 25 Gram(s) IV Push once  dextrose 50% Injectable 12.5 Gram(s) IV Push once  dextrose 50% Injectable 25 Gram(s) IV Push once  glucagon  Injectable 1 milliGRAM(s) IntraMuscular once  heparin   Injectable 5000 Unit(s) SubCutaneous every 8 hours  insulin lispro (ADMELOG) corrective regimen sliding scale   SubCutaneous Before meals and at bedtime  pantoprazole    Tablet 40 milliGRAM(s) Oral before breakfast  polyethylene glycol 3350 17 Gram(s) Oral daily  senna 2 Tablet(s) Oral at bedtime  sodium chloride 0.9% lock flush 3 milliLiter(s) IV Push every 8 hours  sodium chloride 0.9%. 1000 milliLiter(s) (10 mL/Hr) IV Continuous <Continuous>    MEDICATIONS  (PRN):  acetaminophen     Tablet .. 650 milliGRAM(s) Oral every 6 hours PRN Temp greater or equal to 38C (100.4F), Mild Pain (1 - 3)  dextrose Oral Gel 15 Gram(s) Oral once PRN Blood Glucose LESS THAN 70 milliGRAM(s)/deciliter  oxyCODONE    IR 5 milliGRAM(s) Oral every 6 hours PRN Moderate Pain (4 - 6)  oxyCODONE    IR 10 milliGRAM(s) Oral every 6 hours PRN Severe Pain (7 - 10)        RADIOLOGY & ADDITIONAL TESTS:  < from: Xray Chest 1 View- PORTABLE-Routine (Xray Chest 1 View- PORTABLE-Routine in AM.) (03.19.23 @ 05:40) >  IMPRESSION: Status post right-sided central venous catheter removal.   No large pneumothorax    < end of copied text >

## 2023-03-20 NOTE — DIETITIAN INITIAL EVALUATION ADULT - PERTINENT MEDS FT
MEDICATIONS  (STANDING):  aspirin  chewable 81 milliGRAM(s) Oral daily  atorvastatin 80 milliGRAM(s) Oral at bedtime  dextrose 5%. 1000 milliLiter(s) (50 mL/Hr) IV Continuous <Continuous>  dextrose 5%. 1000 milliLiter(s) (100 mL/Hr) IV Continuous <Continuous>  dextrose 50% Injectable 25 Gram(s) IV Push once  dextrose 50% Injectable 12.5 Gram(s) IV Push once  dextrose 50% Injectable 25 Gram(s) IV Push once  glucagon  Injectable 1 milliGRAM(s) IntraMuscular once  heparin   Injectable 5000 Unit(s) SubCutaneous every 8 hours  insulin lispro (ADMELOG) corrective regimen sliding scale   SubCutaneous Before meals and at bedtime  pantoprazole    Tablet 40 milliGRAM(s) Oral before breakfast  polyethylene glycol 3350 17 Gram(s) Oral daily  senna 2 Tablet(s) Oral at bedtime  sodium chloride 0.9% lock flush 3 milliLiter(s) IV Push every 8 hours  sodium chloride 0.9%. 1000 milliLiter(s) (10 mL/Hr) IV Continuous <Continuous>    MEDICATIONS  (PRN):  acetaminophen     Tablet .. 650 milliGRAM(s) Oral every 6 hours PRN Temp greater or equal to 38C (100.4F), Mild Pain (1 - 3)  dextrose Oral Gel 15 Gram(s) Oral once PRN Blood Glucose LESS THAN 70 milliGRAM(s)/deciliter  oxyCODONE    IR 5 milliGRAM(s) Oral every 6 hours PRN Moderate Pain (4 - 6)  oxyCODONE    IR 10 milliGRAM(s) Oral every 6 hours PRN Severe Pain (7 - 10)

## 2023-03-20 NOTE — PROGRESS NOTE ADULT - SUBJECTIVE AND OBJECTIVE BOX
Ortho Note    Pt seen and examined on morning rounds. Pt endorsing slightly less pain and swelling after starting on NSAIDS yesterday  Denies CP, SOB, N/V, numbness/tingling     Vital Signs Last 24 Hrs  T(C): 36.6 (03-20-23 @ 05:24), Max: 36.7 (03-20-23 @ 01:16)  T(F): 97.9 (03-20-23 @ 05:24), Max: 98.1 (03-20-23 @ 01:16)  HR: 56 (03-20-23 @ 05:24) (56 - 56)  BP: 116/56 (03-20-23 @ 05:24) (110/52 - 116/56)  BP(mean): 80 (03-20-23 @ 05:24) (75 - 80)  RR: 18 (03-20-23 @ 05:24) (18 - 18)  SpO2: 95% (03-20-23 @ 05:24) (95% - 95%)  I&O's Summary    18 Mar 2023 07:01  -  19 Mar 2023 07:00  --------------------------------------------------------  IN: 0 mL / OUT: 975 mL / NET: -975 mL    19 Mar 2023 07:01  -  20 Mar 2023 06:22  --------------------------------------------------------  IN: 480 mL / OUT: 575 mL / NET: -95 mL        Physical Exam:  General: Pt Alert and oriented, NAD  R knee swollen, generalized TTP, warmer compared to contralateral leg  ROM 5-110 active, 0-125 passive, smooth arc of motion  no instability to laxity testing.  Pulses: 2+ dp, pt pulses, wwp, cap refill <3 seconds  Sensation: SILT sural/saph/sp/dp/ tibial distributions  Motor: EHL/FHL/TA/GS 5/5                        8.0    8.55  )-----------( 149      ( 20 Mar 2023 05:30 )             24.1     03-19    135  |  99  |  27<H>  ----------------------------<  194<H>  4.8   |  27  |  1.01    Ca    8.6      19 Mar 2023 09:24  Phos  2.3     03-19  Mg     2.0     03-19    TPro  6.0  /  Alb  3.3  /  TBili  0.9  /  DBili  x   /  AST  24  /  ALT  14  /  AlkPhos  53  03-19      A/P: 57yMale w/ atraumatic R knee pain/swelling in setting of recent CT surgery and diuretic use, ortho consulted to r/o gout vs septic knee  - stable  - elevated inflammatory markers but not specific in setting of recent surgery  - low suscipicion of septic knee at this time  - continue to monitor exam while on NSAIDs for possible gout vs OA flare  - WBAT  - PT/OT for mobilization    Mo Freeman, PGY-2  Ortho Pager [phone]

## 2023-03-20 NOTE — DIETITIAN INITIAL EVALUATION ADULT - WEIGHT FOR BMI (LBS)
Problem: Discharge Planning:  Goal: Participates in care planning  Description: Participates in care planning  8/13/2020 0117 by Sue Gaming RN  Outcome: Ongoing  Note: Patient active in care planning. Problem: Discharge Planning:  Goal: Discharged to appropriate level of care  Description: Discharged to appropriate level of care  Outcome: Ongoing  Note: Patient is not a discharge candidate at this time. Planning in process, patient plans to return home with family. Evaluation of any services needed to be addressed. Problem: Anxiety/Stress:  Goal: Level of anxiety will decrease  Description: Level of anxiety will decrease  8/13/2020 0117 by Sue Gaming RN  Outcome: Ongoing  Note: No anxiety at this time reported by patient. Problem: Aspiration:  Goal: Absence of aspiration  Description: Absence of aspiration  8/13/2020 0117 by Sue Gaming RN  Outcome: Ongoing  Note: No signs of aspiration. Problem: Bowel Function - Altered:  Goal: Bowel elimination is within specified parameters  Description: Bowel elimination is within specified parameters  8/13/2020 0117 by Sue Gaming RN  Outcome: Ongoing  Note: No BM's this RN shift     Problem: Cardiac Output - Decreased:  Goal: Hemodynamic stability will improve  Description: Hemodynamic stability will improve  8/13/2020 0117 by Sue Gaming RN  Outcome: Ongoing  Note: VSS of levo. Problem: Pain:  Description: Pain management should include both nonpharmacologic and pharmacologic interventions. Goal: Pain level will decrease  Description: Pain level will decrease  8/13/2020 0117 by Sue Gaming RN  Outcome: Ongoing  Note: Patient has intermittent complaints of pain. Medication given as prescribed. Repositioned for comfort.         Problem: Serum Glucose Level - Abnormal:  Goal: Ability to maintain appropriate glucose levels will improve to within specified parameters  Description: Ability to maintain appropriate glucose levels will improve to within specified parameters  8/13/2020 0117 by Antionette Florez RN  Outcome: Ongoing  Note: Patient started on high dose insulin correction, improving BS. Problem: Skin Integrity - Impaired:  Goal: Will show no infection signs and symptoms  Description: Will show no infection signs and symptoms  8/13/2020 0117 by Antionette Florez RN  Outcome: Ongoing  Note: No skin breakdown noted to bony prominences or coccyx area. Patient is turned every 2 hours and prn with back care given at this time. Problem: Falls - Risk of:  Goal: Will remain free from falls  Description: Will remain free from falls  8/13/2020 0117 by Antionette Florez RN  Outcome: Ongoing  Note: No falls this shift. Patient remains on falling star program with fall band on and falling star on door. Bed exit alarm on. Problem: Restraint Use - Nonviolent/Non-Self-Destructive Behavior:  Goal: Absence of restraint-related injury  Description: Absence of restraint-related injury  Outcome: Completed   Care plan reviewed with patient . Patient able to verbalize understanding of the plan of care and contribute to goal setting. 175

## 2023-03-20 NOTE — DIETITIAN INITIAL EVALUATION ADULT - PERSON TAUGHT/METHOD
Encouraged PO intake during meals & reviewed importance. Tips for GI distress provided. Understanding stated, provide additional motivation as needed./verbal instruction/teach back - (Patient repeats in own words)/patient instructed

## 2023-03-20 NOTE — DIETITIAN INITIAL EVALUATION ADULT - ORAL NUTRITION SUPPLEMENTS
If PO remains decreased over next 48hrs, consider use of oral nutrition supplements (of noted pt reports PO has began to improve which he is happy about)

## 2023-03-20 NOTE — DIETITIAN INITIAL EVALUATION ADULT - PERTINENT LABORATORY DATA
03-20    136  |  100  |  25<H>  ----------------------------<  111<H>  4.0   |  27  |  0.99    Ca    8.2<L>      20 Mar 2023 05:30  Phos  3.7     03-20  Mg     2.1     03-20    TPro  5.4<L>  /  Alb  3.0<L>  /  TBili  0.8  /  DBili  x   /  AST  26  /  ALT  17  /  AlkPhos  48  03-20  POCT Blood Glucose.: 102 mg/dL (03-20-23 @ 11:28)  A1C with Estimated Average Glucose Result: 5.4 % (03-15-23 @ 11:55)

## 2023-03-21 ENCOUNTER — TRANSCRIPTION ENCOUNTER (OUTPATIENT)
Age: 58
End: 2023-03-21

## 2023-03-21 VITALS
HEART RATE: 76 BPM | OXYGEN SATURATION: 96 % | RESPIRATION RATE: 16 BRPM | DIASTOLIC BLOOD PRESSURE: 60 MMHG | SYSTOLIC BLOOD PRESSURE: 132 MMHG

## 2023-03-21 LAB
ANION GAP SERPL CALC-SCNC: 8 MMOL/L — SIGNIFICANT CHANGE UP (ref 5–17)
BUN SERPL-MCNC: 21 MG/DL — SIGNIFICANT CHANGE UP (ref 7–23)
CALCIUM SERPL-MCNC: 8.2 MG/DL — LOW (ref 8.4–10.5)
CHLORIDE SERPL-SCNC: 100 MMOL/L — SIGNIFICANT CHANGE UP (ref 96–108)
CO2 SERPL-SCNC: 26 MMOL/L — SIGNIFICANT CHANGE UP (ref 22–31)
CREAT SERPL-MCNC: 0.99 MG/DL — SIGNIFICANT CHANGE UP (ref 0.5–1.3)
EGFR: 89 ML/MIN/1.73M2 — SIGNIFICANT CHANGE UP
GLUCOSE BLDC GLUCOMTR-MCNC: 116 MG/DL — HIGH (ref 70–99)
GLUCOSE BLDC GLUCOMTR-MCNC: 93 MG/DL — SIGNIFICANT CHANGE UP (ref 70–99)
GLUCOSE SERPL-MCNC: 125 MG/DL — HIGH (ref 70–99)
HCT VFR BLD CALC: 24 % — LOW (ref 39–50)
HGB BLD-MCNC: 8 G/DL — LOW (ref 13–17)
MAGNESIUM SERPL-MCNC: 2 MG/DL — SIGNIFICANT CHANGE UP (ref 1.6–2.6)
MCHC RBC-ENTMCNC: 30.9 PG — SIGNIFICANT CHANGE UP (ref 27–34)
MCHC RBC-ENTMCNC: 33.3 GM/DL — SIGNIFICANT CHANGE UP (ref 32–36)
MCV RBC AUTO: 92.7 FL — SIGNIFICANT CHANGE UP (ref 80–100)
NRBC # BLD: 0 /100 WBCS — SIGNIFICANT CHANGE UP (ref 0–0)
PLATELET # BLD AUTO: 184 K/UL — SIGNIFICANT CHANGE UP (ref 150–400)
POTASSIUM SERPL-MCNC: 5 MMOL/L — SIGNIFICANT CHANGE UP (ref 3.5–5.3)
POTASSIUM SERPL-SCNC: 5 MMOL/L — SIGNIFICANT CHANGE UP (ref 3.5–5.3)
RBC # BLD: 2.59 M/UL — LOW (ref 4.2–5.8)
RBC # FLD: 12.7 % — SIGNIFICANT CHANGE UP (ref 10.3–14.5)
SODIUM SERPL-SCNC: 134 MMOL/L — LOW (ref 135–145)
WBC # BLD: 8.54 K/UL — SIGNIFICANT CHANGE UP (ref 3.8–10.5)
WBC # FLD AUTO: 8.54 K/UL — SIGNIFICANT CHANGE UP (ref 3.8–10.5)

## 2023-03-21 PROCEDURE — P9100: CPT

## 2023-03-21 PROCEDURE — P9045: CPT

## 2023-03-21 PROCEDURE — 84550 ASSAY OF BLOOD/URIC ACID: CPT

## 2023-03-21 PROCEDURE — 86923 COMPATIBILITY TEST ELECTRIC: CPT

## 2023-03-21 PROCEDURE — 71046 X-RAY EXAM CHEST 2 VIEWS: CPT

## 2023-03-21 PROCEDURE — 84100 ASSAY OF PHOSPHORUS: CPT

## 2023-03-21 PROCEDURE — 85025 COMPLETE CBC W/AUTO DIFF WBC: CPT

## 2023-03-21 PROCEDURE — 93005 ELECTROCARDIOGRAM TRACING: CPT

## 2023-03-21 PROCEDURE — C1887: CPT

## 2023-03-21 PROCEDURE — C1751: CPT

## 2023-03-21 PROCEDURE — 73560 X-RAY EXAM OF KNEE 1 OR 2: CPT

## 2023-03-21 PROCEDURE — 80053 COMPREHEN METABOLIC PANEL: CPT

## 2023-03-21 PROCEDURE — 85610 PROTHROMBIN TIME: CPT

## 2023-03-21 PROCEDURE — 80061 LIPID PANEL: CPT

## 2023-03-21 PROCEDURE — 85730 THROMBOPLASTIN TIME PARTIAL: CPT

## 2023-03-21 PROCEDURE — P9012: CPT

## 2023-03-21 PROCEDURE — 84484 ASSAY OF TROPONIN QUANT: CPT

## 2023-03-21 PROCEDURE — C1768: CPT

## 2023-03-21 PROCEDURE — 94002 VENT MGMT INPAT INIT DAY: CPT

## 2023-03-21 PROCEDURE — 84132 ASSAY OF SERUM POTASSIUM: CPT

## 2023-03-21 PROCEDURE — 82550 ASSAY OF CK (CPK): CPT

## 2023-03-21 PROCEDURE — 84443 ASSAY THYROID STIM HORMONE: CPT

## 2023-03-21 PROCEDURE — C1769: CPT

## 2023-03-21 PROCEDURE — 86901 BLOOD TYPING SEROLOGIC RH(D): CPT

## 2023-03-21 PROCEDURE — 97116 GAIT TRAINING THERAPY: CPT

## 2023-03-21 PROCEDURE — P9059: CPT

## 2023-03-21 PROCEDURE — 36415 COLL VENOUS BLD VENIPUNCTURE: CPT

## 2023-03-21 PROCEDURE — 83605 ASSAY OF LACTIC ACID: CPT

## 2023-03-21 PROCEDURE — 86891 AUTOLOGOUS BLOOD OP SALVAGE: CPT

## 2023-03-21 PROCEDURE — 86850 RBC ANTIBODY SCREEN: CPT

## 2023-03-21 PROCEDURE — 71045 X-RAY EXAM CHEST 1 VIEW: CPT

## 2023-03-21 PROCEDURE — P9037: CPT

## 2023-03-21 PROCEDURE — 83735 ASSAY OF MAGNESIUM: CPT

## 2023-03-21 PROCEDURE — 36430 TRANSFUSION BLD/BLD COMPNT: CPT

## 2023-03-21 PROCEDURE — 82553 CREATINE MB FRACTION: CPT

## 2023-03-21 PROCEDURE — 82330 ASSAY OF CALCIUM: CPT

## 2023-03-21 PROCEDURE — 86965 POOLING BLOOD PLATELETS: CPT

## 2023-03-21 PROCEDURE — 97530 THERAPEUTIC ACTIVITIES: CPT

## 2023-03-21 PROCEDURE — 84295 ASSAY OF SERUM SODIUM: CPT

## 2023-03-21 PROCEDURE — 97162 PT EVAL MOD COMPLEX 30 MIN: CPT

## 2023-03-21 PROCEDURE — C1889: CPT

## 2023-03-21 PROCEDURE — 82962 GLUCOSE BLOOD TEST: CPT

## 2023-03-21 PROCEDURE — 88305 TISSUE EXAM BY PATHOLOGIST: CPT

## 2023-03-21 PROCEDURE — 80048 BASIC METABOLIC PNL TOTAL CA: CPT

## 2023-03-21 PROCEDURE — 85027 COMPLETE CBC AUTOMATED: CPT

## 2023-03-21 PROCEDURE — 86140 C-REACTIVE PROTEIN: CPT

## 2023-03-21 PROCEDURE — C1894: CPT

## 2023-03-21 PROCEDURE — 82565 ASSAY OF CREATININE: CPT

## 2023-03-21 PROCEDURE — 94150 VITAL CAPACITY TEST: CPT

## 2023-03-21 PROCEDURE — 71045 X-RAY EXAM CHEST 1 VIEW: CPT | Mod: 26,76

## 2023-03-21 PROCEDURE — 83036 HEMOGLOBIN GLYCOSYLATED A1C: CPT

## 2023-03-21 PROCEDURE — 82803 BLOOD GASES ANY COMBINATION: CPT

## 2023-03-21 PROCEDURE — 86900 BLOOD TYPING SEROLOGIC ABO: CPT

## 2023-03-21 PROCEDURE — 81003 URINALYSIS AUTO W/O SCOPE: CPT

## 2023-03-21 PROCEDURE — 85652 RBC SED RATE AUTOMATED: CPT

## 2023-03-21 PROCEDURE — 93880 EXTRACRANIAL BILAT STUDY: CPT

## 2023-03-21 RX ORDER — POTASSIUM CHLORIDE 20 MEQ
1 PACKET (EA) ORAL
Qty: 7 | Refills: 0
Start: 2023-03-21 | End: 2023-03-27

## 2023-03-21 RX ORDER — OXYCODONE HYDROCHLORIDE 5 MG/1
1 TABLET ORAL
Qty: 12 | Refills: 0
Start: 2023-03-21 | End: 2023-03-23

## 2023-03-21 RX ORDER — POLYETHYLENE GLYCOL 3350 17 G/17G
17 POWDER, FOR SOLUTION ORAL
Qty: 119 | Refills: 0
Start: 2023-03-21 | End: 2023-03-27

## 2023-03-21 RX ORDER — COLCHICINE 0.6 MG
1 TABLET ORAL
Qty: 30 | Refills: 0
Start: 2023-03-21 | End: 2023-04-19

## 2023-03-21 RX ORDER — SENNA PLUS 8.6 MG/1
2 TABLET ORAL
Qty: 14 | Refills: 0
Start: 2023-03-21 | End: 2023-03-27

## 2023-03-21 RX ORDER — OMEPRAZOLE 10 MG/1
1 CAPSULE, DELAYED RELEASE ORAL
Qty: 0 | Refills: 0 | DISCHARGE

## 2023-03-21 RX ORDER — OMEPRAZOLE 10 MG/1
1 CAPSULE, DELAYED RELEASE ORAL
Qty: 30 | Refills: 0
Start: 2023-03-21 | End: 2023-04-19

## 2023-03-21 RX ORDER — METOPROLOL TARTRATE 50 MG
0.5 TABLET ORAL
Qty: 30 | Refills: 0
Start: 2023-03-21 | End: 2023-04-19

## 2023-03-21 RX ORDER — FUROSEMIDE 40 MG
1 TABLET ORAL
Qty: 7 | Refills: 0
Start: 2023-03-21 | End: 2023-03-27

## 2023-03-21 RX ORDER — ACETAMINOPHEN 500 MG
2 TABLET ORAL
Qty: 240 | Refills: 0
Start: 2023-03-21 | End: 2023-04-19

## 2023-03-21 RX ORDER — FUROSEMIDE 40 MG
20 TABLET ORAL ONCE
Refills: 0 | Status: COMPLETED | OUTPATIENT
Start: 2023-03-21 | End: 2023-03-21

## 2023-03-21 RX ORDER — ROSUVASTATIN CALCIUM 5 MG/1
1 TABLET ORAL
Qty: 30 | Refills: 0
Start: 2023-03-21 | End: 2023-04-19

## 2023-03-21 RX ORDER — ASPIRIN/CALCIUM CARB/MAGNESIUM 324 MG
1 TABLET ORAL
Qty: 30 | Refills: 0
Start: 2023-03-21 | End: 2023-04-19

## 2023-03-21 RX ADMIN — Medication 12.5 MILLIGRAM(S): at 01:01

## 2023-03-21 RX ADMIN — OXYCODONE HYDROCHLORIDE 10 MILLIGRAM(S): 5 TABLET ORAL at 12:08

## 2023-03-21 RX ADMIN — OXYCODONE HYDROCHLORIDE 10 MILLIGRAM(S): 5 TABLET ORAL at 05:51

## 2023-03-21 RX ADMIN — OXYCODONE HYDROCHLORIDE 10 MILLIGRAM(S): 5 TABLET ORAL at 07:00

## 2023-03-21 RX ADMIN — OXYCODONE HYDROCHLORIDE 10 MILLIGRAM(S): 5 TABLET ORAL at 11:55

## 2023-03-21 RX ADMIN — Medication 20 MILLIGRAM(S): at 09:37

## 2023-03-21 RX ADMIN — OXYCODONE HYDROCHLORIDE 10 MILLIGRAM(S): 5 TABLET ORAL at 00:57

## 2023-03-21 RX ADMIN — HEPARIN SODIUM 5000 UNIT(S): 5000 INJECTION INTRAVENOUS; SUBCUTANEOUS at 13:25

## 2023-03-21 RX ADMIN — HEPARIN SODIUM 5000 UNIT(S): 5000 INJECTION INTRAVENOUS; SUBCUTANEOUS at 05:50

## 2023-03-21 RX ADMIN — Medication 12.5 MILLIGRAM(S): at 13:26

## 2023-03-21 RX ADMIN — PANTOPRAZOLE SODIUM 40 MILLIGRAM(S): 20 TABLET, DELAYED RELEASE ORAL at 08:17

## 2023-03-21 RX ADMIN — Medication 81 MILLIGRAM(S): at 11:41

## 2023-03-21 RX ADMIN — Medication 0.6 MILLIGRAM(S): at 11:41

## 2023-03-21 RX ADMIN — SODIUM CHLORIDE 3 MILLILITER(S): 9 INJECTION INTRAMUSCULAR; INTRAVENOUS; SUBCUTANEOUS at 13:24

## 2023-03-21 RX ADMIN — SODIUM CHLORIDE 3 MILLILITER(S): 9 INJECTION INTRAMUSCULAR; INTRAVENOUS; SUBCUTANEOUS at 05:35

## 2023-03-21 NOTE — DISCHARGE NOTE PROVIDER - CARE PROVIDERS DIRECT ADDRESSES
[email or web address]

## 2023-03-21 NOTE — DISCHARGE NOTE PROVIDER - PROVIDER TOKENS
PROVIDER:[TOKEN:[[id number]],SCHEDULEDAPPT:[03/27/2023],SCHEDULEDAPPTTIME:[02:15 PM],ESTABLISHEDPATIENT:[T]],PROVIDER:[TOKEN:[[id number]],SCHEDULEDAPPT:[04/04/2023],SCHEDULEDAPPTTIME:[11:15 AM],ESTABLISHEDPATIENT:[T]]

## 2023-03-21 NOTE — DISCHARGE NOTE PROVIDER - NSDCCPTREATMENT_GEN_ALL_CORE_FT
PRINCIPAL PROCEDURE  Procedure: Replacement, aortic root, valve sparing, using Lane technique, with cardiopulmonary bypass  Findings and Treatment:   28mm valvsava graft   Bypass time: 197 mins   Aortic clamp time: 162 mins  CA: 17 mins (ACP 14mins and RCP 3 mins)

## 2023-03-21 NOTE — DISCHARGE NOTE PROVIDER - CARE PROVIDER_API CALL
Yosef Cerna)  Surgery; Thoracic and Cardiac Surgery  [address] [place] [address]  Phone: [phone]  Fax: [phone]  Established Patient  Scheduled Appointment: 03/27/2023 02:15 PM    Sanjay Ortiz)  Cardiovascular Disease  [address] [place] [address]  Phone: [phone]  Fax: [phone]  Established Patient  Scheduled Appointment: 04/04/2023 11:15 AM

## 2023-03-21 NOTE — DISCHARGE NOTE PROVIDER - NSDCCPCAREPLAN_GEN_ALL_CORE_FT
PRINCIPAL DISCHARGE DIAGNOSIS  Diagnosis: Ascending aortic aneurysm  Assessment and Plan of Treatment:

## 2023-03-21 NOTE — DISCHARGE NOTE NURSING/CASE MANAGEMENT/SOCIAL WORK - NSDCFUADDAPPT_GEN_ALL_CORE_FT
Please follow up with Dr. Cerna on 3/27/23 @ 2:15pm ([phone])     Please follow up with Dr. Ortiz on 4/4/23 at 11:15am at [address] [place] [address] ([phone])     Recommend following up with your orthopedic doctor for right knee pain

## 2023-03-21 NOTE — PROGRESS NOTE ADULT - REASON FOR ADMISSION
LHC prior to aortic root replacement

## 2023-03-21 NOTE — PROGRESS NOTE ADULT - PROVIDER SPECIALTY LIST ADULT
CT Surgery
Critical Care
Orthopedics
Critical Care
Orthopedics
CT Surgery
CT Surgery
Critical Care
CT Surgery

## 2023-03-21 NOTE — DISCHARGE NOTE NURSING/CASE MANAGEMENT/SOCIAL WORK - PATIENT PORTAL LINK FT
You can access the FollowMyHealth Patient Portal offered by [facility] by registering at the following website: [email or web address]. By joining [employer]’s FollowMyHealth portal, you will also be able to view your health information using other applications (apps) compatible with our system.

## 2023-03-21 NOTE — DISCHARGE NOTE NURSING/CASE MANAGEMENT/SOCIAL WORK - NSDCPEFALRISK_GEN_ALL_CORE
For information on Fall & Injury Prevention, visit: [email or web address] OR  [email or web address] OR  https://www.cdc.gov/steadi/patient.html

## 2023-03-21 NOTE — DISCHARGE NOTE PROVIDER - HOSPITAL COURSE
Patient discussed on morning rounds with Dr. Cerna    Operation Date: 3/16/23: Valve sparing aortic root replacement, ascending and hemiarch replacement     Primary Surgeon/Attending MD: Nehemiah    Referring Physician: Dr. Sanjay Joiner  _ _ _ _ _ _ _ _ _ _ _ _  HOSPITAL COURSE:  Patient is a 58 y/o male with PMH of nonobstructive CAD, HTN, hiatal hernia & ascending aortic aneurysm. On 3/16 he underwent a valve sparing aortic root replacement, ascending and hemiarch replacement with Dr. Cerna without complication. He arrived to the ICU intubated and on levo. He was quickly weaned off levo and extubated. POD#1 he was in a junctional rhythm with drops in his cardiac output. Indexes improved with a pacing. In the afternoon he began diuresis and was transferred to the floor. On POD#2 normal sinus in the 60s with a good blood pressure and was transferred to the floor. On POD #3 he c/o right knee swelling and erythema & ortho was consulted. On POD 4, he continued to complain of right knee pain and swelling despite the addition of NSAIDs. He is otherwise stable, his pacing wires were removed. He had one episode of SVT and was started on lopressor. Today, POD 5, he has improved ROM to his right knee and as per Dr. Cerna, is stable for d/c home.     _ _ _ _ _ _ _ _ _ _ _ _  DISCHARGE PHYSICAL EXAM:  Appearance: No acute distress.  Neurologic: AAOx3, no AMS or focal deficits.  Responds appropriately to verbal and physical stimuli; exhibits purposeful movement in all extremities.  HEENT:   MMM, PERRLA, EOMI	b/l  Neck: Supple  Cardiovascular: RRR, S1 S2. No m/r/g.  Respiratory: No acute respiratory distress. CTA b/l, no w/r/r.   Gastrointestinal:  Soft, non-tender, non-distended, + BS.	  Skin: No rashes. No ecchymoses. No cyanosis.  Extremities: Bilateral +2 Le edema. Right knee non-pitting edema, tender with limited ROM. Exhibits normal range of motion in other limbs, no clubbing, cyanosis.  Vascular: Peripheral pulses palpable 2+ bilaterally.  Incisions: MSI c/d/i, no click. CT removal site c/d/i.   _ _ _ _ _ _ _ _ _ _ _ _  REMOVAL CHECKLIST:        [ X] Epicardial wires          [ ] Stitches/tie downs,   If no, why? 1 tie down from ayah removal today, 3/21          [ ] PICC/Midline,   If no, why? ________  _ _ _ _ _ _ _ _ _ _ _ _   MEDICATION DISCHARGE CHECKLIST        [X ] Aspirin, [  ] Contraindicated, Reason_______________________________        [X ] Statin, [  ] Contraindicated, Reason_______________________________        [ X] Lasix , [  ] Contraindicated, Reason_______________________________              Duration: __7 days___        [ X] Beta-Blocker, [  ] Contraindicated, Reason_______________________________  _ _ _ _ _ _ _ _ _ _ _ _  RELEVANT LABS/IMAGING:    < from: Xray Chest 1 View-PORTABLE IMMEDIATE (Xray Chest 1 View-PORTABLE IMMEDIATE .) (03.21.23 @ 11:30) >    Findings/  impression: Bilateral opacities/left pleural effusion, unchanged. Stable   cardiomegaly, status post median sternotomy.    < end of copied text >      _ _ _ _ _ _ _ _ _ _ _ _  Over 35 minutes was spent with the patient reviewing the discharge material including medications, follow up appointments, recovery, concerning symptoms, and how to contact their health care providers if they have questions

## 2023-03-21 NOTE — DISCHARGE NOTE PROVIDER - NSDCFUADDAPPT_GEN_ALL_CORE_FT
Please follow up with Dr. Cerna on 3/27/23 @ 2:15pm ([phone])     Please follow up with Dr. Ortiz on 4/4/23 at 11:15am at [address] [place] [address] ([phone])  Please follow up with Dr. Cerna on 3/27/23 @ 2:15pm ([phone])     Please follow up with Dr. Ortiz on 4/4/23 at 11:15am at [address] [place] [address] ([phone])     Recommend following up with your orthopedic doctor for right knee pain

## 2023-03-21 NOTE — CHART NOTE - NSCHARTNOTEFT_GEN_A_CORE
As per Dr. Cerna, chest tube removed at bedside without incident. U stitch tied down in place and occlusive dressing applied. Patient tolerated procedure well. Follow up CXR stable, no pneumothorax.

## 2023-03-21 NOTE — PROGRESS NOTE ADULT - SUBJECTIVE AND OBJECTIVE BOX
Ortho Note    Pt seen and examined on morning rounds. Pt endorsing improvement in pain with NSAIDS, colchicine added yesterday.  Denies CP, SOB, N/V, numbness/tingling     Vital Signs Last 24 Hrs  T(C): 36.8 (03-21-23 @ 05:01), Max: 36.8 (03-21-23 @ 05:00)  T(F): 98.2 (03-21-23 @ 05:01), Max: 98.2 (03-21-23 @ 05:00)  HR: 67 (03-21-23 @ 05:01) (64 - 67)  BP: 115/65 (03-21-23 @ 05:01) (115/65 - 120/66)  BP(mean): --  RR: 16 (03-21-23 @ 05:01) (16 - 16)  SpO2: 98% (03-21-23 @ 05:01) (97% - 98%)  I&O's Summary    19 Mar 2023 07:01  -  20 Mar 2023 07:00  --------------------------------------------------------  IN: 540 mL / OUT: 635 mL / NET: -95 mL    20 Mar 2023 07:01  -  21 Mar 2023 06:58  --------------------------------------------------------  IN: 860 mL / OUT: 1110 mL / NET: -250 mL        Physical Exam:  General: Pt Alert and oriented, NAD  R knee swollen, less TTP, warmer compared to contralateral leg  ROM 0-125 active, 0-130 passive (improving), smooth arc of motion  no instability to laxity testing.  Pulses: 2+ dp, pt pulses, wwp, cap refill <3 seconds  Sensation: SILT sural/saph/sp/dp/ tibial distributions  Motor: EHL/FHL/TA/GS 5/5                          8.0    8.54  )-----------( 184      ( 21 Mar 2023 06:26 )             24.0     03-21    134<L>  |  100  |  21  ----------------------------<  125<H>  5.0   |  26  |  0.99    Ca    8.2<L>      21 Mar 2023 06:26  Phos  3.7     03-20  Mg     2.0     03-21    TPro  5.4<L>  /  Alb  3.0<L>  /  TBili  0.8  /  DBili  x   /  AST  26  /  ALT  17  /  AlkPhos  48  03-20      A/P: 57yMale w/ atraumatic R knee pain/swelling in setting of recent CT surgery and diuretic use, ortho consulted to r/o gout vs septic knee  - stable  - elevated inflammatory markers but not specific in setting of recent surgery  - low suspicion of septic knee at this time  - continue to monitor exam while on NSAIDs for possible gout vs OA flare - exam improving  - WBAT  - PT/OT for mobilization    Mo Freeman, PGY-2  Ortho Pager [phone]

## 2023-03-21 NOTE — DISCHARGE NOTE PROVIDER - NSDCFUSCHEDAPPT_GEN_ALL_CORE_FT
Zachary Razo  [facility]  CARDIOLOGY [address]  Scheduled Appointment: 04/20/2023    [facility]  CARDIOLOGY [address]  Scheduled Appointment: 04/20/2023

## 2023-03-22 ENCOUNTER — NON-APPOINTMENT (OUTPATIENT)
Age: 58
End: 2023-03-22

## 2023-03-23 ENCOUNTER — TRANSCRIPTION ENCOUNTER (OUTPATIENT)
Age: 58
End: 2023-03-23

## 2023-03-24 ENCOUNTER — APPOINTMENT (OUTPATIENT)
Dept: CARE COORDINATION | Facility: HOME HEALTH | Age: 58
End: 2023-03-24
Payer: COMMERCIAL

## 2023-03-24 LAB — SURGICAL PATHOLOGY STUDY: SIGNIFICANT CHANGE UP

## 2023-03-24 PROCEDURE — 99024 POSTOP FOLLOW-UP VISIT: CPT

## 2023-03-26 ENCOUNTER — TRANSCRIPTION ENCOUNTER (OUTPATIENT)
Age: 58
End: 2023-03-26

## 2023-03-27 ENCOUNTER — APPOINTMENT (OUTPATIENT)
Dept: CARDIOTHORACIC SURGERY | Facility: CLINIC | Age: 58
End: 2023-03-27
Payer: COMMERCIAL

## 2023-03-27 ENCOUNTER — OUTPATIENT (OUTPATIENT)
Dept: OUTPATIENT SERVICES | Facility: HOSPITAL | Age: 58
LOS: 1 days | End: 2023-03-27
Payer: COMMERCIAL

## 2023-03-27 VITALS
SYSTOLIC BLOOD PRESSURE: 122 MMHG | HEART RATE: 63 BPM | OXYGEN SATURATION: 100 % | TEMPERATURE: 97.1 F | WEIGHT: 164 LBS | DIASTOLIC BLOOD PRESSURE: 60 MMHG | BODY MASS INDEX: 22.96 KG/M2 | HEIGHT: 71 IN | RESPIRATION RATE: 16 BRPM

## 2023-03-27 DIAGNOSIS — Z98.890 OTHER SPECIFIED POSTPROCEDURAL STATES: Chronic | ICD-10-CM

## 2023-03-27 PROCEDURE — 71046 X-RAY EXAM CHEST 2 VIEWS: CPT | Mod: 26

## 2023-03-27 PROCEDURE — 71046 X-RAY EXAM CHEST 2 VIEWS: CPT

## 2023-03-27 PROCEDURE — 99024 POSTOP FOLLOW-UP VISIT: CPT

## 2023-03-27 NOTE — PRE-ANESTHESIA EVALUATION ADULT - HEIGHT IN FEET
Mirvaso Counseling: Mirvaso is a topical medication which can decrease superficial blood flow where applied. Side effects are uncommon and include stinging, redness and allergic reactions. 5

## 2023-03-28 VITALS
TEMPERATURE: 97.2 F | RESPIRATION RATE: 18 BRPM | OXYGEN SATURATION: 98 % | HEART RATE: 79 BPM | DIASTOLIC BLOOD PRESSURE: 58 MMHG | SYSTOLIC BLOOD PRESSURE: 101 MMHG

## 2023-03-28 RX ORDER — ROSUVASTATIN CALCIUM 20 MG/1
20 TABLET, FILM COATED ORAL DAILY
Refills: 0 | Status: ACTIVE | COMMUNITY
Start: 2023-01-31

## 2023-03-28 RX ORDER — POLYETHYLENE GLYCOL 3350 17 G/17G
17 POWDER, FOR SOLUTION ORAL
Refills: 0 | Status: ACTIVE | COMMUNITY
Start: 2023-03-21

## 2023-03-28 RX ORDER — OMEPRAZOLE 20 MG/1
20 CAPSULE, DELAYED RELEASE ORAL DAILY
Refills: 0 | Status: ACTIVE | COMMUNITY
Start: 2023-03-21

## 2023-03-28 RX ORDER — ASPIRIN 81 MG/1
81 TABLET, CHEWABLE ORAL
Qty: 30 | Refills: 0 | Status: ACTIVE | COMMUNITY
Start: 2023-03-21

## 2023-03-28 RX ORDER — METOPROLOL TARTRATE 25 MG/1
25 TABLET, FILM COATED ORAL
Qty: 30 | Refills: 0 | Status: ACTIVE | COMMUNITY
Start: 2023-03-21

## 2023-03-28 NOTE — REASON FOR VISIT
[de-identified] : valve sparing aortic root replacement, ascending and hemiarch replacement  [de-identified] : 3/16/23 [de-identified] : He arrived to the ICU intubated and on levo. He was quickly weaned off levo and extubated. POD#1 he was in a junctional rhythm with drops in his cardiac output. Indexes improved with a pacing. In the afternoon he began diuresis and was transferred to the floor. On POD#2 normal sinus in the 60s with a good blood pressure and was transferred to the floor. On POD #3 he c/o right knee swelling and erythema & ortho was consulted. On POD 4, he continued to complain of right knee pain and swelling despite the addition of NSAIDs. He is otherwise stable, his pacing wires were removed. He had one episode of SVT and was started on Lopressor. Today, POD 5, he has improved ROM to his right knee and as per Dr. Cerna, is stable for d/c home 3/21/23. \par \par Chest X ray reviewed today and demonstrates clear lung fields. There is no effusion, infiltrate, or pneumothorax.\par \par Patient is recuperating well from surgery. He is ambulating and increasing his activities daily. Patient denies fever, chills, dizziness, syncope, shortness of breath, chest pain, palpitations or peripheral edema.\par

## 2023-03-28 NOTE — ASSESSMENT
[FreeTextEntry1] : 57 year old male s/p valve sparing aortic root replacement, ascending aorta and hemiarch replacement on 3/16/23, presents for postop visit.\par \par - Follow up with PCP/Cardiologist.\par - Continue current medication regimen.\par - Continue to increase activity and walk daily as tolerated. Continue to use incentive spirometer. \par - No driving or strenuous activity for six weeks after surgery. Avoid lifting >10 to 15lbs for first two months after surgery. \par - Continue to use compression stockings. Keep legs elevated above heart when resting/sitting/sleeping.\par - Call MD if you experience fever, fatigue, dizziness, confusion, syncope, shortness of breath, chest pain not relieved with analgesics, increased redness/drainage from incision.\par - Follow up in CTS clinic in one month. \par \par

## 2023-03-28 NOTE — PHYSICAL EXAM
[Sclera] : the sclera and conjunctiva were normal [Neck Appearance] : the appearance of the neck was normal [Jugular Venous Distention Increased] : there was no jugular-venous distention [Respiration, Rhythm And Depth] : normal respiratory rhythm and effort [Exaggerated Use Of Accessory Muscles For Inspiration] : no accessory muscle use [Auscultation Breath Sounds / Voice Sounds] : lungs were clear to auscultation bilaterally [Apical Impulse] : the apical impulse was normal [Heart Rate And Rhythm] : heart rate was normal and rhythm regular [2+] : left 2+ [No Abnormalities] : the abdominal aorta was not enlarged and no bruit was heard [Breast Appearance] : normal in appearance [Breast Palpation Mass] : no palpable masses [Bowel Sounds] : normal bowel sounds [Abdomen Soft] : soft [Abdomen Tenderness] : non-tender [Cervical Lymph Nodes Enlarged Posterior Bilaterally] : posterior cervical [Cervical Lymph Nodes Enlarged Anterior Bilaterally] : anterior cervical [No CVA Tenderness] : no ~M costovertebral angle tenderness [No Spinal Tenderness] : no spinal tenderness [Skin Color & Pigmentation] : normal skin color and pigmentation [Skin Turgor] : normal skin turgor [] : no rash [Deep Tendon Reflexes (DTR)] : deep tendon reflexes were 2+ and symmetric [Sensation] : the sensory exam was normal to light touch and pinprick [No Focal Deficits] : no focal deficits [Oriented To Time, Place, And Person] : oriented to person, place, and time [Impaired Insight] : insight and judgment were intact [Affect] : the affect was normal [Right Carotid Bruit] : no bruit heard over the right carotid [Left Carotid Bruit] : no bruit heard over the left carotid [Right Femoral Bruit] : no bruit heard over the right femoral artery [Left Femoral Bruit] : no bruit heard over the left femoral artery [FreeTextEntry1] : right knee swollen; per patient improved since discharge warm to touch

## 2023-03-28 NOTE — CONSULT LETTER
[Dear  ___] : Dear  [unfilled], [FreeTextEntry2] : Sanjay Ortiz MD\par [address]\[place] [address] [FreeTextEntry1] : We take a multidisciplinary team approach to patient care and consider you, the referring physician, an extension of our team. We will maintain an open line of communication with you throughout your patient's treatment course.  \par \par Mr. YULIA ZARAGOZA  presents to the office today for a post operative visit. \par \par He is doing well status post valve sparing aortic root replacement, ascending and hemiarch replacement on 3/16/23. \par \par I have recommended that the patient will follow up in the [facility] in one month. My office will assist the patient with his upcoming appointment and I will update you on his progress at that time. I have recommended the patient for cardiac rehabilitation to be arranged via your office. \par \par I have discussed with the patient that we will continue to monitor his  aortic pathology closely at the [facility] for the [facility], that encompasses the entire health care system and is one of the largest in the nation at this point.\par \par Enclosure: Operative Note.  Discharge Summary.  Operative Diagram. \par \par I appreciate the opportunity to care for your patient at the [facility] for [facility] based at [facility]. If there are any questions or concerns, please call me directly at [phone]. \par \par Sincerely, \par \par \par \par SU Wade

## 2023-03-28 NOTE — HISTORY OF PRESENT ILLNESS
[FreeTextEntry1] : FYH: [facility]\par Post discharge follow-up and in home assessment\par \par Operation Date: 3/16/23: Valve sparing aortic root replacement, ascending and hemiarch replacement: Primary Surgeon/Attending MD: Nehemiah \par   \par Mendoza Mccauley is a 58 y/o male with PMH of nonobstructive CAD, HTN, hiatal hernia & ascending aortic aneurysm. On 3/16 he underwent a valve sparing aortic root replacement, ascending and hemiarch replacement with Dr. Cerna without complication. He arrived to the ICU intubated and on levo. He was quickly weaned off levo and extubated. Patient discharged to home on 3/22/2023 with [facility] services.  \par \par Post-op incision assessment\par MSI c/d/i margins well approximated; sternum stable CT sites c/d/i suture removed on this visit.\par

## 2023-03-28 NOTE — END OF VISIT
[FreeTextEntry3] : \par MELISSA MARTINS , am scribing for and in the presence of MELISSA KAN the following sections: History of present illness, past Medical/family/surgical/family/social history, review of systems, vital signs, physical exam and disposition.\par \par \par

## 2023-03-28 NOTE — ASSESSMENT
[FreeTextEntry1] : ASSESSMENT \par \par Patient recovering well at home s/p AVR. Reviewed all medications and dosages with patient understanding. Patient has all medications in home and is taking as prescribed. Pain controlled with current medication regimen. No new symptoms, issues or concerns. Reports ambulating around home independently, without the use if assistive device. Denies chest pain, SOB/LUO, nausea/vomiting, constipation/diarrhea. Patient with Right knee swelling and difficulty ambulating during intraop course discharged on Colchicine. Today knee is moderately swollen and warm, patient endorses improved symptoms since discharge. \par \par Plan of care\par \par -No scale in home discussed importance of D/W, patient agrees to purchase scale\par -BP monitor and knee brace rx sent to pharmacy, per patient available at pharmacy needs to  Rx, discussed importance of using brace to lessen stress trauma to affected knee.\par -IS proper use with return demo\par \par Follow Up: \par Yosef Cerna) Surgery; Thoracic and Cardiac Surgery: Scheduled Appointment: 03/27/2023 02:15 PM \par   \par Sanjay Ortiz): Scheduled Appointment: 04/04/2023 11:15 AM \par  \par \par Zachary Razo CARDIOLOGY: Scheduled Appointment: 04/20/2023 \par   \par   \par Please follow up with Dr. Ortiz on 4/4/23 at 11:15am at [address] [place] [address] \par [phone]) \par   \par Recommend following up with your orthopedic doctor for right knee pain \par \par [facility] team will continue to follow up with patient's status. NP/CCC roles explained with patient understanding, contact information provided. Patient agrees to call with any questions, issues or concerns. Worsening symptoms reviewed with patient with reiteration and understanding.\par \par POST OP PLAN\par \par *Adhere to DASH diet\par \par *Do not drive or operate machinery, No heavy lifting/straining, Showering allowed, Stairs allowed, Walking - Indoors allowed, Walking - Outdoors allowed \par \par *Walk daily as tolerated and use your incentive spirometer 10 times every hour while you are awake. Walk around the apartment and transition slowly to stairs and outdoors as tolerated, avoid extreme temperatures when outdoors.\par  \par *Please weigh yourself daily. If you notice over a 3 pound weight gain in 3 days, this is a sign you are likely retaining too much fluid. It is imperative you call our right away with unexplained rapid weight gain. A scale was mailed to your home.\par  \par *Please continue to wear the compression stockings given to you in the hospital at home. This is a way to prevent fluid from building up in your legs. You can remove intermittently for breaks or to wash.\par  \par *No driving or strenuous activity/exercise until cleared by your surgeon. \par  \par *Gently clean your incisions with unscented/antibacterial soap and water, pat dry. You may leave them open to air. \par  \par *Call your doctor if you have shortness of breath, chest pain not relieved by pain medication, dizziness, fever >100.5, or increased redness or drainage from incisions. \par \par \par Sternal Precautions\par Sternal precautions are used to help protect your sternum (breastbone) after open chest surgery. Wires are placed during surgery to hold the sternum together as it heals. Sternal precautions help prevent the wires from cutting through the sternum. The precautions also help prevent the sternum from coming apart from an injury, and prevent pain and bleeding. You may need to use the precautions for up to 12 weeks after surgery.  It is important to follow the instructions carefully. An injury to the healing sternum can be life-threatening.\par \par General sternal precautions: Start slowly and do more as you get stronger. Pain medicine might make it harder for you to know when to slow down or be careful. Stop immediately if you hear a crunch or pop in your sternum.\par \par Protect your sternum. Hug a pillow to your chest or cross your arms over your chest when you laugh, sneeze, or cough.\par \par Be careful when you get into or out of a chair or bed. Hug a pillow or cross your arms when you stand or sit. Do not twist as you move. Use only your legs to sit and stand. You may need to use a raised toilet seat if you have trouble standing up without using your arms. \par \par Ask when you may take a bath or shower. You may need to use a bath chair if you have trouble getting into or out of the tub. Do not use a grab bar.\par \par Do not lift or carry anything heavier than 5 pounds. For example, a gallon of milk weighs 8 pounds.\par \par Keep your arms down as much as possible. Do not put your arms out to the side, behind you, or over your head. Do not let anyone pull your arms to help you move or dress. Do not reach for items.\par \par Do not push or pull anything. Examples include a car door or a vacuum .\par \par Do not drive while you are healing. Your surgeon will tell you when it is safe for you to start driving again.\par

## 2023-03-28 NOTE — REASON FOR VISIT
Behavioral health scheduling number: [phone]  Call to schedule appointment with psychologist.    Start birth control pill Sunday of your next period.    
[Follow-Up: _____] : a [unfilled] follow-up visit

## 2023-04-04 DIAGNOSIS — R57.8 OTHER SHOCK: ICD-10-CM

## 2023-04-04 DIAGNOSIS — Z82.49 FAMILY HISTORY OF ISCHEMIC HEART DISEASE AND OTHER DISEASES OF THE CIRCULATORY SYSTEM: ICD-10-CM

## 2023-04-04 DIAGNOSIS — I42.2 OTHER HYPERTROPHIC CARDIOMYOPATHY: ICD-10-CM

## 2023-04-04 DIAGNOSIS — Z96.653 PRESENCE OF ARTIFICIAL KNEE JOINT, BILATERAL: ICD-10-CM

## 2023-04-04 DIAGNOSIS — I25.10 ATHEROSCLEROTIC HEART DISEASE OF NATIVE CORONARY ARTERY WITHOUT ANGINA PECTORIS: ICD-10-CM

## 2023-04-04 DIAGNOSIS — Z78.1 PHYSICAL RESTRAINT STATUS: ICD-10-CM

## 2023-04-04 DIAGNOSIS — I71.010 DISSECTION OF ASCENDING AORTA: ICD-10-CM

## 2023-04-04 DIAGNOSIS — R00.1 BRADYCARDIA, UNSPECIFIED: ICD-10-CM

## 2023-04-04 DIAGNOSIS — I35.1 NONRHEUMATIC AORTIC (VALVE) INSUFFICIENCY: ICD-10-CM

## 2023-04-04 DIAGNOSIS — K44.9 DIAPHRAGMATIC HERNIA WITHOUT OBSTRUCTION OR GANGRENE: ICD-10-CM

## 2023-04-04 DIAGNOSIS — I47.1 SUPRAVENTRICULAR TACHYCARDIA: ICD-10-CM

## 2023-04-04 DIAGNOSIS — I10 ESSENTIAL (PRIMARY) HYPERTENSION: ICD-10-CM

## 2023-04-04 DIAGNOSIS — L53.9 ERYTHEMATOUS CONDITION, UNSPECIFIED: ICD-10-CM

## 2023-04-04 DIAGNOSIS — I47.20 VENTRICULAR TACHYCARDIA, UNSPECIFIED: ICD-10-CM

## 2023-04-04 DIAGNOSIS — M79.89 OTHER SPECIFIED SOFT TISSUE DISORDERS: ICD-10-CM

## 2023-04-04 DIAGNOSIS — I95.9 HYPOTENSION, UNSPECIFIED: ICD-10-CM

## 2023-04-04 DIAGNOSIS — Z28.310 UNVACCINATED FOR COVID-19: ICD-10-CM

## 2023-04-20 ENCOUNTER — TRANSCRIPTION ENCOUNTER (OUTPATIENT)
Age: 58
End: 2023-04-20

## 2023-04-20 ENCOUNTER — APPOINTMENT (OUTPATIENT)
Dept: CARDIOLOGY | Facility: CLINIC | Age: 58
End: 2023-04-20

## 2023-04-26 ENCOUNTER — OUTPATIENT (OUTPATIENT)
Dept: OUTPATIENT SERVICES | Facility: HOSPITAL | Age: 58
LOS: 1 days | End: 2023-04-26
Payer: COMMERCIAL

## 2023-04-26 ENCOUNTER — APPOINTMENT (OUTPATIENT)
Dept: CARDIOTHORACIC SURGERY | Facility: CLINIC | Age: 58
End: 2023-04-26
Payer: COMMERCIAL

## 2023-04-26 VITALS
DIASTOLIC BLOOD PRESSURE: 91 MMHG | OXYGEN SATURATION: 99 % | HEIGHT: 71 IN | BODY MASS INDEX: 22.82 KG/M2 | TEMPERATURE: 97.4 F | SYSTOLIC BLOOD PRESSURE: 137 MMHG | RESPIRATION RATE: 18 BRPM | HEART RATE: 66 BPM | WEIGHT: 163 LBS

## 2023-04-26 DIAGNOSIS — Z09 ENCOUNTER FOR FOLLOW-UP EXAMINATION AFTER COMPLETED TREATMENT FOR CONDITIONS OTHER THAN MALIGNANT NEOPLASM: ICD-10-CM

## 2023-04-26 DIAGNOSIS — Z98.890 OTHER SPECIFIED POSTPROCEDURAL STATES: Chronic | ICD-10-CM

## 2023-04-26 PROCEDURE — 99024 POSTOP FOLLOW-UP VISIT: CPT

## 2023-04-26 PROCEDURE — 71046 X-RAY EXAM CHEST 2 VIEWS: CPT | Mod: 26

## 2023-04-26 PROCEDURE — 71046 X-RAY EXAM CHEST 2 VIEWS: CPT

## 2023-04-26 RX ORDER — COLCHICINE 0.6 MG/1
0.6 TABLET ORAL DAILY
Refills: 0 | Status: DISCONTINUED | COMMUNITY
Start: 2023-03-21 | End: 2023-04-26

## 2023-04-26 RX ORDER — POTASSIUM CHLORIDE 750 MG/1
10 TABLET, FILM COATED, EXTENDED RELEASE ORAL DAILY
Qty: 6 | Refills: 0 | Status: DISCONTINUED | COMMUNITY
Start: 2023-03-21 | End: 2023-04-26

## 2023-04-26 RX ORDER — FUROSEMIDE 20 MG/1
20 TABLET ORAL
Qty: 30 | Refills: 0 | Status: DISCONTINUED | COMMUNITY
Start: 2023-03-21 | End: 2023-04-26

## 2023-04-26 RX ORDER — LOSARTAN POTASSIUM 25 MG/1
25 TABLET, FILM COATED ORAL DAILY
Refills: 0 | Status: DISCONTINUED | COMMUNITY
End: 2023-04-26

## 2023-04-27 NOTE — PHYSICAL EXAM
[] : no respiratory distress [Respiration, Rhythm And Depth] : normal respiratory rhythm and effort [Exaggerated Use Of Accessory Muscles For Inspiration] : no accessory muscle use [Auscultation Breath Sounds / Voice Sounds] : lungs were clear to auscultation bilaterally [Heart Rate And Rhythm] : heart rate was normal and rhythm regular [Heart Sounds] : normal S1 and S2 [Clean] : clean [Dry] : dry [Healing Well] : healing well [No Edema] : no edema

## 2023-04-28 NOTE — REASON FOR VISIT
[de-identified] : valve sparing aortic root replacement, ascending and hemiarch replacement  [de-identified] : 3/16/23 [de-identified] : Patient presents today for his second postop visit.\par \par Chest X-ray reviewed today and demonstrates clear lung fields.  There is no effusion, infiltrates, or pneumothorax.\par \par The patient continues to feel well and heal from surgery.  He denies chest pain, shortness of breath, syncope, palpitations, orthopnea, PND, or LE edema.  He complains of some dizziness. \par

## 2023-04-28 NOTE — ASSESSMENT
[FreeTextEntry1] : 57 year old male s/p valve sparing aortic root replacement, ascending aorta and hemiarch replacement on 3/16/23, presents for his second postop visit.\par \par The patient had a recent ECHO with Dr. Ortiz that will be obtained.  \par Orthostatics: B/P sittin/71, standin/84\par \par Plan\par \par 1. Continue current medication regimen.\par 2. Continue to increase activity and walk daily as tolerated. \par 3. Avoid lifting >25lbs for 3 months after surgery. \par 4. Follow up in [facility] year with and ECHO.\par 5. Continue routine follow up with his PCP and cardiologist

## 2023-04-28 NOTE — CONSULT LETTER
[FreeTextEntry1] : We take a multidisciplinary team approach to patient care and consider you, the referring physician, an extension of our team. We will maintain an open line of communication with you throughout your patient's treatment course.  \par \par Mr. YULIA ZARAGOZA  presents to the office today for a post operative visit. \par \par He is doing well status post valve sparing aortic root replacement, ascending and hemiarch replacement on 3/16/23. \par \par I have recommended that the patient will follow up in the [facility] Center inone year.  My office will assist the patient with his upcoming appointment and I will update you on his progress at that time. I have recommended the patient for cardiac rehabilitation to be arranged via your office. \par \par I have discussed with the patient that we will continue to monitor his  aortic pathology closely at the [facility] for the [facility], that encompasses the entire health care system and is one of the largest in the nation at this point.\par \par Enclosure: Operative Note.  Discharge Summary.  Operative Diagram. \par \par I appreciate the opportunity to care for your patient at the [facility] for [facility] based at [facility]. If there are any questions or concerns, please call me directly at [phone]. \par \par  [FreeTextEntry2] : Sanjay Ortiz MD\par [address]\[place] [address] [FreeTextEntry3] : Yosef Cerna M.D.\par Professor of Cardiovascular and Thoracic Surgery\par Minimally Invasive Valve Surgeon\par Director of Aortic Surgery, [facility]\par Cell: [phone]\par Email: [email or web address] \par \par [facility]:\par [address], [address] Floor, [place] [address]\par Office: [phone]\par Fax: [phone]\par \par [facility]:\par Department of Cardiovascular and Thoracic Surgery\par [address] [place] [address], [address]\par Office: [phone]\par Fax: [phone]\par \par \par Practice Manager: Ms. Nelly Sethi\par Email: [email or web address] \par Phone: [phone]

## 2023-05-04 ENCOUNTER — APPOINTMENT (OUTPATIENT)
Dept: CARDIOLOGY | Facility: CLINIC | Age: 58
End: 2023-05-04
Payer: COMMERCIAL

## 2023-05-04 VITALS
WEIGHT: 169.31 LBS | HEIGHT: 71 IN | OXYGEN SATURATION: 98 % | TEMPERATURE: 98 F | DIASTOLIC BLOOD PRESSURE: 83 MMHG | BODY MASS INDEX: 23.7 KG/M2 | SYSTOLIC BLOOD PRESSURE: 145 MMHG | HEART RATE: 61 BPM

## 2023-05-04 DIAGNOSIS — Z82.49 FAMILY HISTORY OF ISCHEMIC HEART DISEASE AND OTHER DISEASES OF THE CIRCULATORY SYSTEM: ICD-10-CM

## 2023-05-04 PROCEDURE — 96040: CPT

## 2023-05-04 PROCEDURE — 99204 OFFICE O/P NEW MOD 45 MIN: CPT

## 2023-05-04 NOTE — HISTORY OF PRESENT ILLNESS
[FreeTextEntry1] : YULIA ZARAGOZA is a 56 yo M PMH aortic aneurysm s/p aortic valve sparing aortic root replacement, ascending and hemiarch replacement, bl hip replacement due to cartilage prob \par \par he was first diagnosed at 47 yo \par today he is referred for a cardiogenomic evaluation

## 2023-05-04 NOTE — PHYSICAL EXAM
[Extraocular Movements Intact] : extraocular movements were intact [Pectus Deformity] : no pectus deformity [Normal] : no rash, no stigmata of neurocutaneous disease [Tremor] : no tremor [FreeTextEntry1] : 1 [FreeTextEntry2] : 174 [FreeTextEntry3] : 183 [FreeTextEntry6] : 98 [TWNoteComboBox1] : 0 [TWNoteComboBox2] : 0 [TWNoteComboBox3] : 0 [TWNoteComboBox4] : 0 [TWNoteComboBox5] : 0 [TWNoteComboBox7] : 0 [de-identified] : 0 [de-identified] : 1 [de-identified] : 0 [de-identified] : 1 [de-identified] : 0 [Right] : Right: N [Left] : Left: N [] : No

## 2023-05-04 NOTE — REASON FOR VISIT
[FreeTextEntry3] : Dear Dr. Cerna        . I saw your patient YULIA ZARAGOZA on 05/04/2023 . Please see the note below for the assessment and plan. \par \par YULIA ZARAGOZA  was seen  for an initial consultation at the Cardiogenomics Program at [facility] on 05/04/2023.   Mr. ZARAGOZA was referred by Dr. Cerna for hereditary cardiac predisposition risk assessment and counseling, due to  aortic aneurysm \par

## 2023-07-20 ENCOUNTER — APPOINTMENT (OUTPATIENT)
Dept: CARDIOLOGY | Facility: CLINIC | Age: 58
End: 2023-07-20

## 2023-12-30 NOTE — PHYSICAL THERAPY INITIAL EVALUATION ADULT - ONSET DATE, REHAB EVAL
[FreeTextEntry1] : 66M with PMH of HTN, COPD, ESRD on HD (MWF), T-cell lymphoma (diagnosed 19 years ago, s/p chemo), AF (now s/p Watchman no longer on Eliquis and on ASA per wife), systolic CHF (EF 40-45%), severe pulmonary HTN, severe MR and TR, s/p VATS procedure in 2021, and ERIK, presenting for large polyps noted on inpatient colonoscopy in the sigmoid colon (path showing TA with focal high-grade dysplasia), so referred to Dr. Piper for polypectomy. Patient states that he has been in good health but is frustrated to learn that he will need another colonoscopy to remove these polyps. Has no acute complaints at this time. Denies nausea. vomiting, diarrhea, fevers, chills, abdominal pain, melena, hematemesis, hematochezia, or dysphagia. Says that he no longer takes Eliquis after his Watchman and is now just on aspirin daily. Has an appointment to see his cardiologist (Dr. Ventura) at the end of January.       
16-Mar-2023

## 2024-05-29 ENCOUNTER — APPOINTMENT (OUTPATIENT)
Dept: CARDIOTHORACIC SURGERY | Facility: CLINIC | Age: 59
End: 2024-05-29
Payer: COMMERCIAL

## 2024-05-29 VITALS
SYSTOLIC BLOOD PRESSURE: 154 MMHG | DIASTOLIC BLOOD PRESSURE: 68 MMHG | HEART RATE: 56 BPM | TEMPERATURE: 96.9 F | OXYGEN SATURATION: 97 % | HEIGHT: 71 IN | BODY MASS INDEX: 24.36 KG/M2 | WEIGHT: 174 LBS

## 2024-05-29 DIAGNOSIS — Z86.79 OTHER SPECIFIED POSTPROCEDURAL STATES: ICD-10-CM

## 2024-05-29 DIAGNOSIS — I71.21 ANEURYSM OF THE ASCENDING AORTA, WITHOUT RUPTURE: ICD-10-CM

## 2024-05-29 DIAGNOSIS — Z98.890 OTHER SPECIFIED POSTPROCEDURAL STATES: ICD-10-CM

## 2024-05-29 PROCEDURE — 99213 OFFICE O/P EST LOW 20 MIN: CPT

## 2024-05-29 RX ORDER — SENNOSIDES 8.6 MG TABLETS 8.6 MG/1
8.6 TABLET ORAL
Qty: 60 | Refills: 0 | Status: DISCONTINUED | COMMUNITY
Start: 2023-03-21 | End: 2024-05-29

## 2024-05-29 RX ORDER — OXYCODONE 5 MG/1
5 TABLET ORAL
Refills: 0 | Status: DISCONTINUED | COMMUNITY
Start: 2023-03-21 | End: 2024-05-29

## 2024-05-29 RX ORDER — ACETAMINOPHEN 325 MG/1
325 TABLET ORAL EVERY 6 HOURS
Qty: 240 | Refills: 0 | Status: DISCONTINUED | COMMUNITY
Start: 2023-03-21 | End: 2024-05-29

## 2024-05-31 NOTE — HISTORY OF PRESENT ILLNESS
[FreeTextEntry1] : 58-year-old male with a past medical hx of nonobstructive CAD and HTN, thoracic aortic aneurysm now status post valve sparing aortic root replacement, ascending and hemiarch replacement 3/16/23, presents for a follow up visit with repeat diagnostic imaging. Family hx significant for aortic aneurysm (2 brothers under surveillance). Cardiologist Dr. Sanjay Joiner. PCP Dr. Dottie Oh.  TTE 5/17/24:  EF 58% trileaflet AV mild AI.   Patient is doing well and denies recent hospitalization, ER visits, or surgeries. He  denies fever, chills, fatigue, headache, blurred vision, dizziness, syncope, chest pain, palpitations, shortness of breath, orthopnea, paroxysmal nocturnal dyspnea, nausea, vomiting, abdominal pain, back pain, BRBPR or swelling to legs.

## 2024-05-31 NOTE — END OF VISIT
[Time Spent: ___ minutes] : I have spent [unfilled] minutes of time on the encounter. [FreeTextEntry3] : I, Dr. Wisam Wyman, personally performed the evaluation and management (E/M) services for this established patient who presents today with (a) new problem(s)/exacerbation of (an) existing condition(s).  That E/M includes conducting the clinically appropriate interval history &/or exam, assessing all new/exacerbated conditions, and establishing a new plan of care.  Today, my ROSSY,  was here to observe &/or participate in the visit & follow plan of care established by me.

## 2024-05-31 NOTE — PHYSICAL EXAM
[Sclera] : the sclera and conjunctiva were normal [Neck Appearance] : the appearance of the neck was normal [Jugular Venous Distention Increased] : there was no jugular-venous distention [] : no respiratory distress [Respiration, Rhythm And Depth] : normal respiratory rhythm and effort [Auscultation Breath Sounds / Voice Sounds] : lungs were clear to auscultation bilaterally [Heart Rate And Rhythm] : heart rate was normal and rhythm regular [Heart Sounds] : normal S1 and S2 [Examination Of The Chest] : the chest was normal in appearance [2+] : left 2+ [Bowel Sounds] : normal bowel sounds [Abdomen Soft] : soft [No CVA Tenderness] : no ~M costovertebral angle tenderness [Abnormal Walk] : normal gait [Skin Color & Pigmentation] : normal skin color and pigmentation [No Focal Deficits] : no focal deficits [Oriented To Time, Place, And Person] : oriented to person, place, and time [FreeTextEntry1] : deferred

## 2024-05-31 NOTE — ASSESSMENT
[FreeTextEntry1] : I have reviewed the patient's medical records, diagnostic images during the time of this office consultation and have made the following recommendation. TTE was completed of the thoracic aorta. The report was reviewed and noted in the chart, I independently reviewed and interpreted images and report and I reviewed the films/CD and agree. The surgical repair is intact and stable.  Plan  - Follow up in [facility] in 1 year with TTE and CTA chest. - Continue medication regimen. - Follow up with cardiologist and PCP. - Blood pressure management. - Discussed signs and symptoms that warrant emergency medical attention.

## 2024-06-17 NOTE — DISCHARGE NOTE PROVIDER - NSDCMRMEDTOKEN_GEN_ALL_CORE_FT
Referring provider: Dr. Hilario    [facility]iologist: [facility]    Reason for consult: re-establish care loop, AFL      Impression:   Biotronik loop recorder   Implanted on 5/13/21  24 AF episodes - longest 44 minutes, EGMs cannot rule out AF, at time p waves seen and irregular R-R seen (update)  Referral back to EP for request for removal of loop recorder  Atrial flutter  Symptoms difficult to appreciate as patient has not been assessed in sinus rhythm, but reports increased fatigue and dyspnea on exertion   Diagnosed 3/21/20 during hospitalization for COVID-19 pneumonia   Established with Cardiology, Dr. Coronado on 12/3/20- referred to EP as patient interested in ablation if a candidate   Established with EP on 1/15/21  S/p CTI ablation 3/26/21 (atria severely enlarged)    CHAVSVASc at least 2 (Age, DM)  Not on anticoagulation given patient choice  Initiated 4/22/20 during hospitalization for COVID-19 pneumonia, also when AFL first diagnosed   Declined anticoagulation 5/27/21  Elevated left hemidiaphragm   Cardiac Imaging  Echo 1/30/24: EF 60-65  Stress test 10/26/18: probably normal       ECG(s) and prior pertinent cardiovascular testing (which may include echocardiograms, radiologic imaging studies, ambulatory monitoring, and implanted device interrogations) on file were reviewed, independently interpreted, and summarized as outlined above and/or below.     Recommendations:   ***        HPI: Ismael Shaw is a 73 year old male seen in clinic at the request of Dr. Hilario for removal of loop recorder. He was previously established with EP services, last seen in 2021. He has a Biotronik loop recorder that was implanted 5/13/21 for arrhythmia surveillance, also with a history of atrial flutter. Past medical history reviewed and summarized.    In office today,         PAST MEDICAL HX:    Hyperlipidemia                                                Low back pain                                                 Other  nonspecific abnormal finding of lung fie*               Post-traumatic stress disorder, chronic                       Diabetes mellitus  (CMD)                        2003            Comment: Type 2    Mononeuritis                                                  Cataract                                                      PAST SURGICAL HX:    PERCUT TRACH PUNCT/ASPIRATN                     04/10/2020      Comment: Covid related- later closure    COLONOSCOPY                                     06/13/2007    TONSILLECTOMY                                                 CATARACT EXTRACTION, BILATERAL                                COLONOSCOPY                                     10/16/2017      Comment: normal- [facility]  repeat in 2027    LOOP RECORDER IMPLANT                           05/13/2021    KYPHOPLASTY                                     08/28/2021      Comment: T12 Balloon Kyphoplasty and vertebral body                biopsy    Allergies and Medications were reviewed.    Fam Hx: No h/o sudden cardiac death or premature CAD     Social: No TOB, ETOH or Drug abuse     ROS: Above review of system completed by nursing staff and reviewed by provider. Pertinent items are noted in the history of present illness (HPI).    PHYSICAL EXAM:  There were no vitals taken for this visit.  General: { phys exam:668622}, alert and oriented, not in acute distress  Skin: No rashes, lesions, or obvious signs of bleeding or bruising on exposed and visible areas of skin  HEENT: pupils equal, anicteric sclera. No evident structural abnormalities or lesions involving eyes/nose/throat or visible mucosal surfaces  Neck: No visible thyroid enlargement or evident masses/nodules. No elevation of jugular venous pulse  Heart: Rate ~***bpm, ***regular rhythm, Normal S1 and S2. ***No added sounds. No appreciable murmurs, rubs, or gallops  Lungs: Clear to auscultation bilaterally. No wheezing, rales, rhonchi.   Abdomen: Soft, non-tender, not  distended, normal bowel sounds on auscultation.   Extremities: 2+ radial pulses bilaterally***.  ***edema  Neurologic: Awake, alert, oriented***. Cranial nerves II to XII grossly intact. Moving all extremities spontaneously.    Access sites: Femoral pulses intact bilaterally. Tegaderm in place. Dressing clean, dry, intact without bleeding or discharge on or beyond dressing margins. No visible or palpable hematoma. Soft, non-tender to palpation.      Device site: LEFT***RIGHT*** upper chest device incision site clean, dry, intact. No visible or palpable hematoma, drainage, ecchymosis. Soft., ***non-tender with palpation. No evidence of device or lead erosion.      I have personally reviewed and interpreted EKG below.  I have reviewed and summarized old records as per the impression section.    EKG:     Device: Dr. Farooq personally reviewed the device interrogation/programming while patient was in the office. Normal device function was noted     Labs:   Lab Results   Component Value Date    WBC 5.5 05/27/2024    HGB 14.9 05/27/2024    HCT 44.9 05/27/2024     05/27/2024    INR 1.1 05/05/2020    POTASSIUM 4.6 05/27/2024    BUN 17 05/27/2024    CREATININE 0.88 05/27/2024    TSH 0.832 03/22/2020       On 06/17/24, I,***, scribed the services personally performed by Dr. Farooq.               acetaminophen 325 mg oral tablet: 2 tab(s) orally every 6 hours, As Needed -Temp greater or equal to 38C (100.4F), Mild Pain (1 - 3)   aspirin 81 mg oral tablet, chewable: 1 tab(s) orally once a day  colchicine 0.6 mg oral tablet: 1 tab(s) orally once a day  Crestor 20 mg oral tablet: 1 tab(s) orally once a day  furosemide 20 mg oral tablet: 1 tab(s) orally once a day   metoprolol tartrate 25 mg oral tablet: 0.5 tab(s) orally every 12 hours   omeprazole 20 mg oral delayed release tablet: 1 tab(s) orally once a day  oxyCODONE 5 mg oral tablet: 1 tab(s) orally every 6 hours, As needed, Moderate Pain (4 - 6) MDD:4  polyethylene glycol 3350 oral powder for reconstitution: 17 gram(s) orally once a day  Potassium Chloride (Eqv-Klor-Con 10) 10 mEq oral tablet, extended release: 1 tab(s) orally once a day   senna leaf extract oral tablet: 2 tab(s) orally once a day (at bedtime), As Needed

## 2024-12-08 NOTE — H&P ADULT - BP NONINVASIVE DIASTOLIC (MM HG)
Brain MRI Pituitary w/wo contrast reviewed by vascular neurology. No acute infarct appreciated on MRI. Vascular neurology will sign off at this time. See consult note for full recommendations.    Please do not hesitate to contact us with any questions. Thank you for involving us in the care of this patient.   58

## 2025-08-22 ENCOUNTER — APPOINTMENT (OUTPATIENT)
Dept: CT IMAGING | Facility: HOSPITAL | Age: 60
End: 2025-08-22

## 2025-08-26 ENCOUNTER — NON-APPOINTMENT (OUTPATIENT)
Age: 60
End: 2025-08-26

## 2025-09-17 ENCOUNTER — NON-APPOINTMENT (OUTPATIENT)
Age: 60
End: 2025-09-17

## 2025-09-17 ENCOUNTER — APPOINTMENT (OUTPATIENT)
Dept: CARDIOTHORACIC SURGERY | Facility: CLINIC | Age: 60
End: 2025-09-17
Payer: COMMERCIAL

## 2025-09-17 VITALS
BODY MASS INDEX: 23.8 KG/M2 | TEMPERATURE: 98 F | WEIGHT: 170 LBS | OXYGEN SATURATION: 98 % | HEART RATE: 59 BPM | DIASTOLIC BLOOD PRESSURE: 72 MMHG | SYSTOLIC BLOOD PRESSURE: 122 MMHG | HEIGHT: 71 IN

## 2025-09-17 DIAGNOSIS — Z86.79 OTHER SPECIFIED POSTPROCEDURAL STATES: ICD-10-CM

## 2025-09-17 DIAGNOSIS — Z98.890 OTHER SPECIFIED POSTPROCEDURAL STATES: ICD-10-CM

## 2025-09-17 DIAGNOSIS — Q25.43 CONGENITAL ANEURYSM OF AORTA: ICD-10-CM

## 2025-09-17 PROCEDURE — 99214 OFFICE O/P EST MOD 30 MIN: CPT

## 2025-09-18 PROBLEM — Q25.43 AORTIC ROOT ANEURYSM: Status: ACTIVE | Noted: 2023-01-27

## 2025-09-18 RX ORDER — EZETIMIBE 10 MG/1
10 TABLET ORAL
Refills: 0 | Status: ACTIVE | COMMUNITY